# Patient Record
Sex: FEMALE | Race: OTHER | NOT HISPANIC OR LATINO | URBAN - METROPOLITAN AREA
[De-identification: names, ages, dates, MRNs, and addresses within clinical notes are randomized per-mention and may not be internally consistent; named-entity substitution may affect disease eponyms.]

---

## 2021-12-23 ENCOUNTER — EMERGENCY (EMERGENCY)
Facility: HOSPITAL | Age: 26
LOS: 1 days | Discharge: HOME | End: 2021-12-23
Attending: EMERGENCY MEDICINE | Admitting: EMERGENCY MEDICINE
Payer: MEDICAID

## 2021-12-23 VITALS
DIASTOLIC BLOOD PRESSURE: 99 MMHG | HEART RATE: 75 BPM | SYSTOLIC BLOOD PRESSURE: 131 MMHG | TEMPERATURE: 99 F | OXYGEN SATURATION: 100 % | RESPIRATION RATE: 16 BRPM

## 2021-12-23 LAB
ALBUMIN SERPL ELPH-MCNC: 4.6 G/DL — SIGNIFICANT CHANGE UP (ref 3.3–5)
ALP SERPL-CCNC: 58 U/L — SIGNIFICANT CHANGE UP (ref 40–120)
ALT FLD-CCNC: 22 U/L — SIGNIFICANT CHANGE UP (ref 4–33)
ANION GAP SERPL CALC-SCNC: 9 MMOL/L — SIGNIFICANT CHANGE UP (ref 7–14)
AST SERPL-CCNC: 18 U/L — SIGNIFICANT CHANGE UP (ref 4–32)
BASOPHILS # BLD AUTO: 0.04 K/UL — SIGNIFICANT CHANGE UP (ref 0–0.2)
BASOPHILS NFR BLD AUTO: 0.7 % — SIGNIFICANT CHANGE UP (ref 0–2)
BILIRUB SERPL-MCNC: 0.3 MG/DL — SIGNIFICANT CHANGE UP (ref 0.2–1.2)
BUN SERPL-MCNC: 12 MG/DL — SIGNIFICANT CHANGE UP (ref 7–23)
CALCIUM SERPL-MCNC: 8.9 MG/DL — SIGNIFICANT CHANGE UP (ref 8.4–10.5)
CHLORIDE SERPL-SCNC: 105 MMOL/L — SIGNIFICANT CHANGE UP (ref 98–107)
CO2 SERPL-SCNC: 24 MMOL/L — SIGNIFICANT CHANGE UP (ref 22–31)
CREAT SERPL-MCNC: 0.67 MG/DL — SIGNIFICANT CHANGE UP (ref 0.5–1.3)
EOSINOPHIL # BLD AUTO: 0.11 K/UL — SIGNIFICANT CHANGE UP (ref 0–0.5)
EOSINOPHIL NFR BLD AUTO: 1.8 % — SIGNIFICANT CHANGE UP (ref 0–6)
GLUCOSE SERPL-MCNC: 96 MG/DL — SIGNIFICANT CHANGE UP (ref 70–99)
HCT VFR BLD CALC: 39.6 % — SIGNIFICANT CHANGE UP (ref 34.5–45)
HGB BLD-MCNC: 14 G/DL — SIGNIFICANT CHANGE UP (ref 11.5–15.5)
IANC: 3.53 K/UL — SIGNIFICANT CHANGE UP (ref 1.5–8.5)
IMM GRANULOCYTES NFR BLD AUTO: 0.2 % — SIGNIFICANT CHANGE UP (ref 0–1.5)
LYMPHOCYTES # BLD AUTO: 1.76 K/UL — SIGNIFICANT CHANGE UP (ref 1–3.3)
LYMPHOCYTES # BLD AUTO: 29.3 % — SIGNIFICANT CHANGE UP (ref 13–44)
MCHC RBC-ENTMCNC: 31.1 PG — SIGNIFICANT CHANGE UP (ref 27–34)
MCHC RBC-ENTMCNC: 35.4 GM/DL — SIGNIFICANT CHANGE UP (ref 32–36)
MCV RBC AUTO: 88 FL — SIGNIFICANT CHANGE UP (ref 80–100)
MONOCYTES # BLD AUTO: 0.56 K/UL — SIGNIFICANT CHANGE UP (ref 0–0.9)
MONOCYTES NFR BLD AUTO: 9.3 % — SIGNIFICANT CHANGE UP (ref 2–14)
NEUTROPHILS # BLD AUTO: 3.53 K/UL — SIGNIFICANT CHANGE UP (ref 1.8–7.4)
NEUTROPHILS NFR BLD AUTO: 58.7 % — SIGNIFICANT CHANGE UP (ref 43–77)
NRBC # BLD: 0 /100 WBCS — SIGNIFICANT CHANGE UP
NRBC # FLD: 0 K/UL — SIGNIFICANT CHANGE UP
PLATELET # BLD AUTO: 299 K/UL — SIGNIFICANT CHANGE UP (ref 150–400)
POTASSIUM SERPL-MCNC: 4.2 MMOL/L — SIGNIFICANT CHANGE UP (ref 3.5–5.3)
POTASSIUM SERPL-SCNC: 4.2 MMOL/L — SIGNIFICANT CHANGE UP (ref 3.5–5.3)
PROT SERPL-MCNC: 7.6 G/DL — SIGNIFICANT CHANGE UP (ref 6–8.3)
RBC # BLD: 4.5 M/UL — SIGNIFICANT CHANGE UP (ref 3.8–5.2)
RBC # FLD: 12.5 % — SIGNIFICANT CHANGE UP (ref 10.3–14.5)
SARS-COV-2 RNA SPEC QL NAA+PROBE: SIGNIFICANT CHANGE UP
SODIUM SERPL-SCNC: 138 MMOL/L — SIGNIFICANT CHANGE UP (ref 135–145)
TSH SERPL-MCNC: 1.19 UIU/ML — SIGNIFICANT CHANGE UP (ref 0.27–4.2)
WBC # BLD: 6.01 K/UL — SIGNIFICANT CHANGE UP (ref 3.8–10.5)
WBC # FLD AUTO: 6.01 K/UL — SIGNIFICANT CHANGE UP (ref 3.8–10.5)

## 2021-12-23 PROCEDURE — 99284 EMERGENCY DEPT VISIT MOD MDM: CPT

## 2021-12-23 RX ORDER — SODIUM CHLORIDE 9 MG/ML
1000 INJECTION INTRAMUSCULAR; INTRAVENOUS; SUBCUTANEOUS ONCE
Refills: 0 | Status: COMPLETED | OUTPATIENT
Start: 2021-12-23 | End: 2021-12-23

## 2021-12-23 RX ADMIN — SODIUM CHLORIDE 1000 MILLILITER(S): 9 INJECTION INTRAMUSCULAR; INTRAVENOUS; SUBCUTANEOUS at 12:02

## 2021-12-23 NOTE — ED PROVIDER NOTE - ATTENDING CONTRIBUTION TO CARE
DR. ANGULO, ATTENDING MD-  I performed a face to face bedside interview with the patient regarding history of present illness, review of symptoms and past medical history. I completed an independent physical exam.  I have discussed the patient's plan of care with the resident.   Documentation as above in the note.    HPI / ROS / PE / MDM written by myself.

## 2021-12-23 NOTE — ED PROVIDER NOTE - CLINICAL SUMMARY MEDICAL DECISION MAKING FREE TEXT BOX
25 y/o female with lightheadedness, chills, diarrhea, nausea.  Likely viral syndrome, eval for anemia, lyte abn, thyroid dysfunction, pregnancy.  Obtain ekg cbc cmp tsh ucg covid swab give ivf bolus reassess, likely dc with pcp f/u as o/p.

## 2021-12-23 NOTE — ED ADULT TRIAGE NOTE - CHIEF COMPLAINT QUOTE
Pt c/o feeling like a cold rush and heat at the same time, nausea, and dizziness  x 2 days; diarrhea today.  Pt seen at [facility];   covid testing pending.

## 2021-12-23 NOTE — ED PROVIDER NOTE - NSFOLLOWUPINSTRUCTIONS_ED_ALL_ED_FT
1. TAKE ALL MEDICATIONS AS DIRECTED.  FOR PAIN YOU CAN TAKE ACETAMINOPHEN (TYLENOL) AS NEEDED, AS DIRECTED ON PACKAGING.  2. FOLLOW UP WITH primary care doctor AS DIRECTED.  YOU WERE GIVEN COPIES OF ALL LABS AND IMAGING RESULTS FROM YOUR ER VISIT--PLEASE TAKE THEM WITH YOU TO YOUR APPOINTMENT.  3. IF NEEDED, CALL [phone] TO FIND A PRIMARY CARE PHYSICIAN.  OR CALL [phone] TO MAKE AN APPOINTMENT WITH THE MEDICAL CLINIC.  4. You likely have a viral syndrome, continue to stay hydrated and return to ED if any new concerning or worsening symptoms.     Diarrhea    Diarrhea is frequent loose or watery bowel movements that has many causes. Diarrhea can make you feel weak and cause you to become dehydrated. Diarrhea typically lasts 2–3 days, but can last longer if it is a sign of something more serious. Drink clear fluids to prevent dehydration. Eat bland, easy-to-digest foods as tolerated.     SEEK IMMEDIATE MEDICAL CARE IF YOU HAVE ANY OF THE FOLLOWING SYMPTOMS: high fevers, lightheadedness/dizziness, chest pain, black or bloody stools, shortness of breath, severe abdominal or back pain, or any signs of dehydration.

## 2021-12-23 NOTE — ED PROVIDER NOTE - PROGRESS NOTE DETAILS
Danay Sierra DO (PGY1): Pt resting comfortably. Lab results non-actionable. Pt made aware of lab and imaging results. Questions regarding their symptoms were addressed. Advised to follow up with primary care doctor. Given strict return precautions. Pt verbalized understanding.

## 2021-12-23 NOTE — ED PROVIDER NOTE - PATIENT PORTAL LINK FT
You can access the FollowMyHealth Patient Portal offered by [facility] by registering at the following website: [email or web address]. By joining [employer]’s FollowMyHealth portal, you will also be able to view your health information using other applications (apps) compatible with our system.

## 2021-12-23 NOTE — ED PROVIDER NOTE - OBJECTIVE STATEMENT
27 y/o female with c/o chills lightheadedness diarrhea nausea x2 days.  Has covid vax.  Denies sick contacts or recent travel.  No prior h/o such sx.  Feels progressively worse.  [facility]

## 2021-12-23 NOTE — ED ADULT NURSE NOTE - OBJECTIVE STATEMENT
Pt c/o feeling like a cold rush and heat at the same time, nausea, and dizziness  x 2 days; diarrhea today. 20 g  line placed in RT AC, labs sent.

## 2022-01-09 ENCOUNTER — INPATIENT (INPATIENT)
Facility: HOSPITAL | Age: 27
LOS: 2 days | Discharge: HOME | End: 2022-01-12
Attending: STUDENT IN AN ORGANIZED HEALTH CARE EDUCATION/TRAINING PROGRAM | Admitting: STUDENT IN AN ORGANIZED HEALTH CARE EDUCATION/TRAINING PROGRAM
Payer: MEDICAID

## 2022-01-09 VITALS
TEMPERATURE: 98 F | SYSTOLIC BLOOD PRESSURE: 134 MMHG | DIASTOLIC BLOOD PRESSURE: 68 MMHG | OXYGEN SATURATION: 100 % | HEART RATE: 143 BPM | RESPIRATION RATE: 18 BRPM

## 2022-01-09 DIAGNOSIS — E87.1 HYPO-OSMOLALITY AND HYPONATREMIA: ICD-10-CM

## 2022-01-09 DIAGNOSIS — Z29.9 ENCOUNTER FOR PROPHYLACTIC MEASURES, UNSPECIFIED: ICD-10-CM

## 2022-01-09 DIAGNOSIS — N32.89 OTHER SPECIFIED DISORDERS OF BLADDER: ICD-10-CM

## 2022-01-09 DIAGNOSIS — A41.9 SEPSIS, UNSPECIFIED ORGANISM: ICD-10-CM

## 2022-01-09 DIAGNOSIS — N17.9 ACUTE KIDNEY FAILURE, UNSPECIFIED: ICD-10-CM

## 2022-01-09 DIAGNOSIS — R60.0 LOCALIZED EDEMA: ICD-10-CM

## 2022-01-09 DIAGNOSIS — L02.91 CUTANEOUS ABSCESS, UNSPECIFIED: ICD-10-CM

## 2022-01-09 DIAGNOSIS — L03.90 CELLULITIS, UNSPECIFIED: ICD-10-CM

## 2022-01-09 LAB
ALBUMIN SERPL ELPH-MCNC: 3.3 G/DL — SIGNIFICANT CHANGE UP (ref 3.3–5)
ALP SERPL-CCNC: 79 U/L — SIGNIFICANT CHANGE UP (ref 40–120)
ALT FLD-CCNC: 28 U/L — SIGNIFICANT CHANGE UP (ref 4–33)
ANION GAP SERPL CALC-SCNC: 16 MMOL/L — HIGH (ref 7–14)
ANION GAP SERPL CALC-SCNC: 17 MMOL/L — HIGH (ref 7–14)
APPEARANCE UR: CLEAR — SIGNIFICANT CHANGE UP
APTT BLD: 28.8 SEC — SIGNIFICANT CHANGE UP (ref 27–36.3)
AST SERPL-CCNC: 27 U/L — SIGNIFICANT CHANGE UP (ref 4–32)
BASOPHILS # BLD AUTO: 0 K/UL — SIGNIFICANT CHANGE UP (ref 0–0.2)
BASOPHILS # BLD AUTO: 0.04 K/UL — SIGNIFICANT CHANGE UP (ref 0–0.2)
BASOPHILS NFR BLD AUTO: 0 % — SIGNIFICANT CHANGE UP (ref 0–2)
BASOPHILS NFR BLD AUTO: 0.2 % — SIGNIFICANT CHANGE UP (ref 0–2)
BILIRUB SERPL-MCNC: 0.4 MG/DL — SIGNIFICANT CHANGE UP (ref 0.2–1.2)
BILIRUB UR-MCNC: NEGATIVE — SIGNIFICANT CHANGE UP
BLOOD GAS VENOUS COMPREHENSIVE RESULT: SIGNIFICANT CHANGE UP
BUN SERPL-MCNC: 30 MG/DL — HIGH (ref 7–23)
BUN SERPL-MCNC: 31 MG/DL — HIGH (ref 7–23)
CALCIUM SERPL-MCNC: 8.3 MG/DL — LOW (ref 8.4–10.5)
CALCIUM SERPL-MCNC: 9.1 MG/DL — SIGNIFICANT CHANGE UP (ref 8.4–10.5)
CHLORIDE SERPL-SCNC: 92 MMOL/L — LOW (ref 98–107)
CHLORIDE SERPL-SCNC: 99 MMOL/L — SIGNIFICANT CHANGE UP (ref 98–107)
CO2 SERPL-SCNC: 18 MMOL/L — LOW (ref 22–31)
CO2 SERPL-SCNC: 20 MMOL/L — LOW (ref 22–31)
COLOR SPEC: COLORLESS — SIGNIFICANT CHANGE UP
CREAT SERPL-MCNC: 2.26 MG/DL — HIGH (ref 0.5–1.3)
CREAT SERPL-MCNC: 2.64 MG/DL — HIGH (ref 0.5–1.3)
DIFF PNL FLD: NEGATIVE — SIGNIFICANT CHANGE UP
EOSINOPHIL # BLD AUTO: 0 K/UL — SIGNIFICANT CHANGE UP (ref 0–0.5)
EOSINOPHIL # BLD AUTO: 0.05 K/UL — SIGNIFICANT CHANGE UP (ref 0–0.5)
EOSINOPHIL NFR BLD AUTO: 0 % — SIGNIFICANT CHANGE UP (ref 0–6)
EOSINOPHIL NFR BLD AUTO: 0.2 % — SIGNIFICANT CHANGE UP (ref 0–6)
GLUCOSE SERPL-MCNC: 113 MG/DL — HIGH (ref 70–99)
GLUCOSE SERPL-MCNC: 88 MG/DL — SIGNIFICANT CHANGE UP (ref 70–99)
GLUCOSE UR QL: NEGATIVE — SIGNIFICANT CHANGE UP
HCG SERPL-ACNC: <5 MIU/ML — SIGNIFICANT CHANGE UP
HCT VFR BLD CALC: 29.6 % — LOW (ref 34.5–45)
HCT VFR BLD CALC: 33.5 % — LOW (ref 34.5–45)
HGB BLD-MCNC: 11.5 G/DL — SIGNIFICANT CHANGE UP (ref 11.5–15.5)
HGB BLD-MCNC: 9.8 G/DL — LOW (ref 11.5–15.5)
IANC: 15.06 K/UL — HIGH (ref 1.5–8.5)
IANC: 19.74 K/UL — HIGH (ref 1.5–8.5)
IMM GRANULOCYTES NFR BLD AUTO: 1.4 % — SIGNIFICANT CHANGE UP (ref 0–1.5)
INR BLD: 1.23 RATIO — HIGH (ref 0.88–1.16)
KETONES UR-MCNC: NEGATIVE — SIGNIFICANT CHANGE UP
LACTATE SERPL-SCNC: 2 MMOL/L — SIGNIFICANT CHANGE UP (ref 0.5–2)
LEUKOCYTE ESTERASE UR-ACNC: NEGATIVE — SIGNIFICANT CHANGE UP
LYMPHOCYTES # BLD AUTO: 0.88 K/UL — LOW (ref 1–3.3)
LYMPHOCYTES # BLD AUTO: 1.36 K/UL — SIGNIFICANT CHANGE UP (ref 1–3.3)
LYMPHOCYTES # BLD AUTO: 3.9 % — LOW (ref 13–44)
LYMPHOCYTES # BLD AUTO: 7.1 % — LOW (ref 13–44)
MAGNESIUM SERPL-MCNC: 2.4 MG/DL — SIGNIFICANT CHANGE UP (ref 1.6–2.6)
MCHC RBC-ENTMCNC: 30.3 PG — SIGNIFICANT CHANGE UP (ref 27–34)
MCHC RBC-ENTMCNC: 30.5 PG — SIGNIFICANT CHANGE UP (ref 27–34)
MCHC RBC-ENTMCNC: 33.1 GM/DL — SIGNIFICANT CHANGE UP (ref 32–36)
MCHC RBC-ENTMCNC: 34.3 GM/DL — SIGNIFICANT CHANGE UP (ref 32–36)
MCV RBC AUTO: 88.9 FL — SIGNIFICANT CHANGE UP (ref 80–100)
MCV RBC AUTO: 91.6 FL — SIGNIFICANT CHANGE UP (ref 80–100)
MONOCYTES # BLD AUTO: 1.72 K/UL — HIGH (ref 0–0.9)
MONOCYTES # BLD AUTO: 2.03 K/UL — HIGH (ref 0–0.9)
MONOCYTES NFR BLD AUTO: 10.6 % — SIGNIFICANT CHANGE UP (ref 2–14)
MONOCYTES NFR BLD AUTO: 7.6 % — SIGNIFICANT CHANGE UP (ref 2–14)
NEUTROPHILS # BLD AUTO: 14.75 K/UL — HIGH (ref 1.8–7.4)
NEUTROPHILS # BLD AUTO: 19.74 K/UL — HIGH (ref 1.8–7.4)
NEUTROPHILS NFR BLD AUTO: 72.6 % — SIGNIFICANT CHANGE UP (ref 43–77)
NEUTROPHILS NFR BLD AUTO: 86.7 % — HIGH (ref 43–77)
NITRITE UR-MCNC: NEGATIVE — SIGNIFICANT CHANGE UP
NRBC # BLD: 0 /100 WBCS — SIGNIFICANT CHANGE UP
NRBC # FLD: 0 K/UL — SIGNIFICANT CHANGE UP
PH UR: 6 — SIGNIFICANT CHANGE UP (ref 5–8)
PHOSPHATE SERPL-MCNC: 5.6 MG/DL — HIGH (ref 2.5–4.5)
PLATELET # BLD AUTO: 333 K/UL — SIGNIFICANT CHANGE UP (ref 150–400)
PLATELET # BLD AUTO: 373 K/UL — SIGNIFICANT CHANGE UP (ref 150–400)
POTASSIUM SERPL-MCNC: 4 MMOL/L — SIGNIFICANT CHANGE UP (ref 3.5–5.3)
POTASSIUM SERPL-MCNC: 4.3 MMOL/L — SIGNIFICANT CHANGE UP (ref 3.5–5.3)
POTASSIUM SERPL-SCNC: 4 MMOL/L — SIGNIFICANT CHANGE UP (ref 3.5–5.3)
POTASSIUM SERPL-SCNC: 4.3 MMOL/L — SIGNIFICANT CHANGE UP (ref 3.5–5.3)
PROT SERPL-MCNC: 7.6 G/DL — SIGNIFICANT CHANGE UP (ref 6–8.3)
PROT UR-MCNC: NEGATIVE — SIGNIFICANT CHANGE UP
PROTHROM AB SERPL-ACNC: 13.9 SEC — HIGH (ref 10.6–13.6)
RBC # BLD: 3.23 M/UL — LOW (ref 3.8–5.2)
RBC # BLD: 3.77 M/UL — LOW (ref 3.8–5.2)
RBC # FLD: 12.8 % — SIGNIFICANT CHANGE UP (ref 10.3–14.5)
RBC # FLD: 13.1 % — SIGNIFICANT CHANGE UP (ref 10.3–14.5)
SARS-COV-2 RNA SPEC QL NAA+PROBE: SIGNIFICANT CHANGE UP
SODIUM SERPL-SCNC: 129 MMOL/L — LOW (ref 135–145)
SODIUM SERPL-SCNC: 133 MMOL/L — LOW (ref 135–145)
SP GR SPEC: 1.01 — SIGNIFICANT CHANGE UP (ref 1–1.05)
UROBILINOGEN FLD QL: SIGNIFICANT CHANGE UP
WBC # BLD: 19.16 K/UL — HIGH (ref 3.8–10.5)
WBC # BLD: 22.76 K/UL — HIGH (ref 3.8–10.5)
WBC # FLD AUTO: 19.16 K/UL — HIGH (ref 3.8–10.5)
WBC # FLD AUTO: 22.76 K/UL — HIGH (ref 3.8–10.5)

## 2022-01-09 PROCEDURE — 93010 ELECTROCARDIOGRAM REPORT: CPT

## 2022-01-09 PROCEDURE — 71045 X-RAY EXAM CHEST 1 VIEW: CPT | Mod: 26

## 2022-01-09 PROCEDURE — 71260 CT THORAX DX C+: CPT | Mod: 26,MA

## 2022-01-09 PROCEDURE — 99223 1ST HOSP IP/OBS HIGH 75: CPT | Mod: GC

## 2022-01-09 PROCEDURE — 74177 CT ABD & PELVIS W/CONTRAST: CPT | Mod: 26,MA

## 2022-01-09 PROCEDURE — 99285 EMERGENCY DEPT VISIT HI MDM: CPT | Mod: 25

## 2022-01-09 RX ORDER — MUPIROCIN 20 MG/G
0 OINTMENT TOPICAL
Qty: 0 | Refills: 0 | DISCHARGE

## 2022-01-09 RX ORDER — SODIUM CHLORIDE 9 MG/ML
1000 INJECTION, SOLUTION INTRAVENOUS ONCE
Refills: 0 | Status: COMPLETED | OUTPATIENT
Start: 2022-01-09 | End: 2022-01-09

## 2022-01-09 RX ORDER — SODIUM CHLORIDE 9 MG/ML
1000 INJECTION, SOLUTION INTRAVENOUS
Refills: 0 | Status: DISCONTINUED | OUTPATIENT
Start: 2022-01-09 | End: 2022-01-12

## 2022-01-09 RX ORDER — ONDANSETRON 8 MG/1
4 TABLET, FILM COATED ORAL ONCE
Refills: 0 | Status: COMPLETED | OUTPATIENT
Start: 2022-01-09 | End: 2022-01-09

## 2022-01-09 RX ORDER — ONDANSETRON 8 MG/1
0 TABLET, FILM COATED ORAL
Qty: 0 | Refills: 0 | DISCHARGE

## 2022-01-09 RX ORDER — IBUPROFEN 200 MG
0 TABLET ORAL
Qty: 0 | Refills: 0 | DISCHARGE

## 2022-01-09 RX ORDER — MORPHINE SULFATE 50 MG/1
4 CAPSULE, EXTENDED RELEASE ORAL ONCE
Refills: 0 | Status: DISCONTINUED | OUTPATIENT
Start: 2022-01-09 | End: 2022-01-09

## 2022-01-09 RX ORDER — ACETAMINOPHEN 500 MG
1000 TABLET ORAL ONCE
Refills: 0 | Status: COMPLETED | OUTPATIENT
Start: 2022-01-09 | End: 2022-01-09

## 2022-01-09 RX ORDER — SODIUM CHLORIDE 9 MG/ML
1000 INJECTION INTRAMUSCULAR; INTRAVENOUS; SUBCUTANEOUS ONCE
Refills: 0 | Status: COMPLETED | OUTPATIENT
Start: 2022-01-09 | End: 2022-01-09

## 2022-01-09 RX ORDER — VANCOMYCIN HCL 1 G
1000 VIAL (EA) INTRAVENOUS ONCE
Refills: 0 | Status: COMPLETED | OUTPATIENT
Start: 2022-01-09 | End: 2022-01-09

## 2022-01-09 RX ORDER — CEFAZOLIN SODIUM 1 G
2000 VIAL (EA) INJECTION ONCE
Refills: 0 | Status: COMPLETED | OUTPATIENT
Start: 2022-01-09 | End: 2022-01-09

## 2022-01-09 RX ORDER — HEPARIN SODIUM 5000 [USP'U]/ML
5000 INJECTION INTRAVENOUS; SUBCUTANEOUS EVERY 8 HOURS
Refills: 0 | Status: DISCONTINUED | OUTPATIENT
Start: 2022-01-09 | End: 2022-01-12

## 2022-01-09 RX ORDER — ENOXAPARIN SODIUM 100 MG/ML
40 INJECTION SUBCUTANEOUS DAILY
Refills: 0 | Status: DISCONTINUED | OUTPATIENT
Start: 2022-01-09 | End: 2022-01-09

## 2022-01-09 RX ORDER — ONDANSETRON 8 MG/1
8 TABLET, FILM COATED ORAL
Qty: 0 | Refills: 0 | DISCHARGE

## 2022-01-09 RX ADMIN — SODIUM CHLORIDE 1000 MILLILITER(S): 9 INJECTION, SOLUTION INTRAVENOUS at 06:30

## 2022-01-09 RX ADMIN — ONDANSETRON 4 MILLIGRAM(S): 8 TABLET, FILM COATED ORAL at 05:29

## 2022-01-09 RX ADMIN — Medication 1000 MILLIGRAM(S): at 07:00

## 2022-01-09 RX ADMIN — MORPHINE SULFATE 4 MILLIGRAM(S): 50 CAPSULE, EXTENDED RELEASE ORAL at 05:29

## 2022-01-09 RX ADMIN — SODIUM CHLORIDE 1000 MILLILITER(S): 9 INJECTION, SOLUTION INTRAVENOUS at 08:29

## 2022-01-09 RX ADMIN — Medication 1000 MILLIGRAM(S): at 06:00

## 2022-01-09 RX ADMIN — Medication 100 MILLIGRAM(S): at 05:30

## 2022-01-09 RX ADMIN — SODIUM CHLORIDE 75 MILLILITER(S): 9 INJECTION, SOLUTION INTRAVENOUS at 15:01

## 2022-01-09 RX ADMIN — Medication 400 MILLIGRAM(S): at 05:30

## 2022-01-09 RX ADMIN — Medication 250 MILLIGRAM(S): at 09:40

## 2022-01-09 RX ADMIN — SODIUM CHLORIDE 1000 MILLILITER(S): 9 INJECTION INTRAMUSCULAR; INTRAVENOUS; SUBCUTANEOUS at 08:51

## 2022-01-09 RX ADMIN — SODIUM CHLORIDE 1000 MILLILITER(S): 9 INJECTION, SOLUTION INTRAVENOUS at 07:05

## 2022-01-09 RX ADMIN — Medication 2000 MILLIGRAM(S): at 06:00

## 2022-01-09 RX ADMIN — SODIUM CHLORIDE 1000 MILLILITER(S): 9 INJECTION, SOLUTION INTRAVENOUS at 05:41

## 2022-01-09 RX ADMIN — MORPHINE SULFATE 4 MILLIGRAM(S): 50 CAPSULE, EXTENDED RELEASE ORAL at 06:00

## 2022-01-09 NOTE — H&P ADULT - NSICDXFAMILYHX_GEN_ALL_CORE_FT
FAMILY HISTORY:  Family history of early CAD    Father  Still living? Unknown  FH: CAD (coronary artery disease), Age at diagnosis: Age Unknown  FH: HTN (hypertension), Age at diagnosis: Age Unknown  FHx: diabetes mellitus, Age at diagnosis: Age Unknown    Mother  Still living? Unknown  FHx: diabetes mellitus, Age at diagnosis: Age Unknown

## 2022-01-09 NOTE — ED PROVIDER NOTE - PHYSICAL EXAMINATION
GENERAL: Patient awake alert NAD.  HEENT: NC/AT.  LUNGS: CTAB, no wheezes or crackles.   CARDIAC: RRR, no m/r/g.    ABDOMEN: Soft, diffuse abd tenderness, ND, No rebound, guarding.  EXT: 2+ pitting edema of b/l LEs. No calf tenderness. CV 2+DP/PT bilaterally.  MSK: No deformities.  NEURO: A&Ox3. Moving all extremities.  SKIN: Erythema and warmth over b/l buttocks at the site of procedure, TTP, lower abdomen erythematous, warm and TTP. Blister noted on left flank. Ecchymosis over lower chest.  PSYCH: Normal affect.

## 2022-01-09 NOTE — H&P ADULT - NSHPREVIEWOFSYSTEMS_GEN_ALL_CORE
REVIEW OF SYSTEMS:  CONSTITUTIONAL: + fever, +chills, + fatigue  NECK: No LAD  RESPIRATORY: No cough, or hemoptysis; No shortness of breath  CARDIOVASCULAR: No chest pain, palpitations, +dizziness, +leg swelling  GASTROINTESTINAL: +Abdominal pain, +nausea, no vomiting or hematemesis; +constipation.  GENITOURINARY: No dysuria, frequency, hematuria, or incontinence  SKIN: +Erythema and tenderness along b/l buttocks  LYMPH NODES: No enlarged glands  MUSCULOSKELETAL: +Buttock pain. No muscle, back, or extremity pain.   HEME/LYMPH: No easy bruising, or bleeding gums

## 2022-01-09 NOTE — H&P ADULT - NSHPPHYSICALEXAM_GEN_ALL_CORE
VITALS:   T(C): 37.3 (01-09-22 @ 10:28), Max: 37.3 (01-09-22 @ 10:28)  HR: 124 (01-09-22 @ 10:28) (113 - 143)  BP: 115/73 (01-09-22 @ 10:28) (115/73 - 134/68)  RR: 20 (01-09-22 @ 10:28) (16 - 20)  SpO2: 100% (01-09-22 @ 10:28) (99% - 100%)    PHYSICAL EXAM:     GENERAL: NAD, prone on bed.  HEAD:  Atraumatic, normocephalic.  EYES: PERRLA, conjunctiva and sclera clear.  ENT: Moist mucous membranes.  NECK: Supple, no LAD.  CHEST/LUNG: CTAB. No rales, rhonchi, wheezing, or rubs. Unlabored respirations.  HEART: Tachycardic, no M/R/G, S1/S2  ABDOMEN: Tense, diffusely tender to light touch. Normoactive bowel sounds.  EXTREMITIES:  2+ peripheral pulses b/l, brisk capillary refill. 2+ pitting edema.  Neurological:  AAOx3.   MSK: Tense, tender b/l buttocks.  SKIN: Erythema on b/l hips near surgical incision sites, L>>R, measuring ~6 x 6 in on R and 4 x 4 in on L.  PSYCH: Normal affect and mood. VITALS:   T(C): 37.3 (01-09-22 @ 10:28), Max: 37.3 (01-09-22 @ 10:28)  HR: 124 (01-09-22 @ 10:28) (113 - 143)  BP: 115/73 (01-09-22 @ 10:28) (115/73 - 134/68)  RR: 20 (01-09-22 @ 10:28) (16 - 20)  SpO2: 100% (01-09-22 @ 10:28) (99% - 100%)    PHYSICAL EXAM:     GENERAL: NAD, prone on bed.  HEAD:  Atraumatic, normocephalic.  EYES: PERRLA, conjunctiva and sclera clear.  ENT: Moist mucous membranes.  NECK: Supple, no LAD.  CHEST/LUNG: CTAB. No rales, rhonchi, wheezing, or rubs. Unlabored respirations.  HEART: Tachycardic, no M/R/G, S1/S2  ABDOMEN: Tense, diffusely tender to light touch. Normoactive bowel sounds.  EXTREMITIES:  2+ peripheral pulses b/l, brisk capillary refill. 1-2+ pitting edema.  Neurological:  AAOx3.   MSK: Tense, tender b/l buttocks.  SKIN: Erythema and induration on b/l hips near surgical incision sites, L>>R, measuring ~6 x 6 in on R and 4 x 4 in on L.  PSYCH: Normal affect and mood.

## 2022-01-09 NOTE — ED ADULT NURSE NOTE - NSIMPLEMENTINTERV_GEN_ALL_ED
Implemented All Fall Risk Interventions:  [place] to call system. Call bell, personal items and telephone within reach. Instruct patient to call for assistance. Room bathroom lighting operational. Non-slip footwear when patient is off stretcher. Physically safe environment: no spills, clutter or unnecessary equipment. Stretcher in lowest position, wheels locked, appropriate side rails in place. Provide visual cue, wrist band, yellow gown, etc. Monitor gait and stability. Monitor for mental status changes and reorient to person, place, and time. Review medications for side effects contributing to fall risk. Reinforce activity limits and safety measures with patient and family.

## 2022-01-09 NOTE — ED ADULT NURSE REASSESSMENT NOTE - NS ED NURSE REASSESS COMMENT FT1
Pt awake, alert and oriented x 4 reports improvement in pain from earlier.   Currently denies pain while lying prone.   denies n/v/d.    denies chest pain and SOB.   denies headache or dizziness.    IV site x 2 unremarkable.    Tolerating PO.    Ambulatory independently to bathroom to void.    awaiting inpatient bed.

## 2022-01-09 NOTE — ED ADULT TRIAGE NOTE - BRAND OF COVID-19 VACCINATION
Digital Medicine: Health  Current Pfizer dose 1 and 2

## 2022-01-09 NOTE — H&P ADULT - ATTENDING COMMENTS
26F no PMH, s/p BBL on 1/2/22 now presented with worsening pain and swelling of her surgical sites. Patient reports red and purulent drainage at home ~3 days ago. pain and swelling worsen overnight. was treated with Bactrim by her surgeon w/o improvement. Vitals, afebrile, tachycardic, HD stable. Labs, leukocytosis, REGINALD, no lactate or significant acidemia. CT done with b/l mild hydroureteronephrosis iso distended bladder. On exam, slightly lethargic, tachycardic, significant edema in b/l LE, and patient has subjective swelling of her hands.   Impression is 26F with sepsis secondary to cellulitis/inflammation of her surgical sites, resulted in her REGINALD. no notable collection on exam or CT. no significant lactate to suggest Nec [place]. no drainage now for culture. Bcx pending. s/p Vanc and cefazolin, likely organism include Staph, strep and enterococcus. Vanco alone should suffice for coverage at this time. f/u cultures and monitor response. vanco dosing by trough. need weight for CrCl calculation.  REGINALD iso infection, pre-renal vs ATN. potential post-obstructive given CT finding, but patient is urinating per nursing. check PVR to ensure adequate emptying of bladder. s/p 3L by ED, will cont IVF given persistent tachycardia to 120s. monitor UOP. Hyponatremia should resolve with hydration, monitor labs. b/l LE edema, checking dopplers to rule-out DVT given recent surgery and decreased mobility due to infection and pain. DVT ppx, diet as tolerated.   plan discussed with House staff, RN and patient at bedside.

## 2022-01-09 NOTE — H&P ADULT - PROBLEM SELECTOR PLAN 3
- Patient found to be hyponatremic with Na 129 on admission  - Nephro c/s as above - Patient found to be hyponatremic with Na 129 on admission  - Likely in acute setting - Patient found to be hyponatremic with Na 129 on admission  - Likely in acute setting  - s/p 3L fluids  - repeat labs  - maintenance fluids

## 2022-01-09 NOTE — H&P ADULT - PROBLEM SELECTOR PLAN 1
- 2/2 infection (cellulitis vs. abscess) 2/2 BBL on 1/2/2022 at [facility] (Replaced by [facility]  - L-side ~6x6in, erythematous  - R-side ~4x4in  - No abscess seen on CT, however cannot r/o cellulitis  - Pt given prophylactic bactrim by plastic surgeon  - Afebrile on admission, but reported F/C prior to admission  - Tachy to 140s on admission  - WBC 19.16 on admission  - UA negative  - BCx, UCx pending  - Empiric coverage with vanc/jarad iso REGINALD  - Tylenol PRN for fever - 2/2 infection (cellulitis vs. abscess) 2/2 BBL on 1/2/2022 at [facility] ([facility]  - L-side ~6x6in, erythematous  - R-side ~4x4in  - No abscess seen on CT, however cannot r/o cellulitis  - Pt given prophylactic bactrim by plastic surgeon  - Afebrile on admission, but reported F/C prior to admission  - Tachy to 140s on admission  - WBC 19.16 on admission  - UA negative  - BCx, UCx pending  - Empiric coverage with vanc/jarad iso REGINALD  - Tylenol PRN for fever  - Plastics c/s  - Consider ID c/s pending cxs - 2/2 infection (cellulitis vs. abscess) [facility]  - L-side enlarged, erythematous and indurated to greater extent than R-side  - No abscess seen on CT, however cannot r/o cellulitis  - Pt given prophylactic bactrim by plastic surgeon  - Afebrile on admission, but reported F/C prior to admission  - Tachy to 140s on admission  - WBC 19.16 on admission  - Lactate 1.6  - UA negative  - BCx, UCx pending  - s/f cellulitis  - Vanc by trough     - get weight  - Tylenol PRN for fever  - Can c/s Plastics and ID if complications arise  - Repeat CBC, lactate 5pm

## 2022-01-09 NOTE — H&P ADULT - PROBLEM SELECTOR PLAN 4
DVT PPx: Lovenox   Diet: Renal  Dispo: - 2+ PE today on exam  - Likely 2/2 3rd spacing from vasodilation 2/2 sepsis  - r/o DVT - Dopplers

## 2022-01-09 NOTE — ED PROVIDER NOTE - OBJECTIVE STATEMENT
26F w/ no significant PMHx p/w fevers, chills, palpitations, pain at site of surgery, b/l LE edema after having a Brazilian Butt Lift on 1/2/22 at [facility].  Pt. had a f/u appt w/ plastic surgeon a few days ago, who states the edema, ecchymosis, and erythema is to be expected.  Pt. was started on bactrim prophylactically.  States she's vomiting and unable to tolerate PO and cannot sit down.  Denies any CP, SOB.

## 2022-01-09 NOTE — H&P ADULT - HISTORY OF PRESENT ILLNESS
Patient is a 25 yo F with PMH herniated disc, renal artery enlargement (unclear laterality) presenting with pain and erythema in b/l buttocks and swelling in buttocks, genitals and LE following Brazilian butt lift on 1/2/2022. Patient reports associated F/C, N without vomiting, HA, dizziness (predominantly orthostatic), fatigue, and numbness in the affected areas starting 1/6/2022. Patient was seen by [facility] in [place] for the procedure. She had followed up with her plastic surgeon a few days ago, who reported that her edema, ecchymosis and erythema were to be expected. Patient was started on bactrim DS prophylactically. Patient has not been able to lie down comfortably on her back and is having pain sitting down. Denies CP, SOB, cough, LAD, abnormal bleeding.    Patient is COVID vaccinated x2 (last dose 8/2022)    In ED, patient was given vanco x1, cefazolin x1, 2L LR, 1L NS, morphine 4mg x1. WBC 19, Cr 2.26, UA neg. BCx, UCx pending. CXR clear, CT C/A/P +diffuse subq edema + fatty stranding, w/o fluid to suggestion abscess, and mild hydroureteronephrosis likely 2/2 distended bladder, no stone. Patient is a 25 yo F with PMH herniated disc, renal artery enlargement (unclear laterality) presenting with pain and erythema in b/l buttocks and swelling in buttocks, genitals and LE following Brazilian butt lift on 1/2/2022. Patient reports associated F/C, N without vomiting, HA, dizziness (predominantly orthostatic), fatigue, and numbness in the affected areas starting 1/6/2022. Patient was seen by [facility] in [place] for the procedure. She had followed up with her plastic surgeon a few days ago, who reported that her edema, ecchymosis and erythema were to be expected. Patient was started on bactrim DS prophylactically. Patient has not been able to lie down comfortably on her back and is having pain sitting down. Denies CP, SOB, cough, LAD, abnormal bleeding.    Patient is COVID vaccinated x2 (last dose 8/2022)    ED course: afebrile, tachy to 140s, lactate 1.6. WBC 19, Cr 2.26, Na 129, UA neg. BCx, UCx pending. CXR clear, CT C/A/P +diffuse subq edema + fatty stranding, w/o fluid to suggestion abscess, and mild hydroureteronephrosis likely 2/2 distended bladder, no stone. s/p vanco x1, cefazolin x1, 2L LR, 1L NS, morphine 4mg x1.

## 2022-01-09 NOTE — ED PROVIDER NOTE - CLINICAL SUMMARY MEDICAL DECISION MAKING FREE TEXT BOX
26F p/w palpitations, f/c, n/v, erythematous, warm tender areas at surgical incision sites after having a cosmetic plastic surgery on 1/2.  Exam as above, VS w/ tachycardia.  Pt. unable to sit 2/2 pain.  Concern that pt. is septic from a cellulitis.  Given extensive area of ecchymosis and erythema, will obtain CT of chest/abd/pelvis w/ extension to mid thighs.  Will obtain labs, administer fluids, abx, analgesics, anti-emetics, reassess, and dispo pending results.

## 2022-01-09 NOTE — ED PROVIDER NOTE - PROGRESS NOTE DETAILS
Aly, PGY3: pt tachy to 110, hospitalist recs for another liter fluids and to expand to give mrsa coverage with vanco as its from surg site infx

## 2022-01-09 NOTE — H&P ADULT - NSICDXPASTMEDICALHX_GEN_ALL_CORE_FT
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

## 2022-01-09 NOTE — ED PROVIDER NOTE - WET READ LAUNCH FT
ASSESSMENT/PLAN:  1.  Peripheral edema: Check echo.  Advise to get velcro stockings. Check labs. Add spironolactone per orders. Recheck 1 week.    SUBJECTIVE:  Chief Complaint   Patient presents with   • Leg     swelling and red marks      1.  Peripheral Edema; Having problems with swelling in legs; seems to be getting worse; tries to elevate. Can't get stockings that don't compress too much or cause problems.  Taking meds daily.    I have reviewed the patient's medications and allergies, past medical, surgical, social and family history, updating these as appropriate.  See Histories section of the Electronic Medical Record for a display of this information.    Review of Systems:   As above per the History of Present Illness, otherwise essentially negative.  Constitutional: Negative for fever and chills.   Respiratory: Negative for cough and shortness of breath.    Cardiovascular: Negative for chest pain or chest pressure.   Gastrointestinal: Negative for nausea, vomiting, abdominal pain and diarrhea.   Genitourinary: Negative for dysuria, urgency, frequency, flank pain or hematuria.   Skin: Negative for rash.   Neurological: Negative for headaches, vertigo, change in sensory or motor function.   All other systems reviewed and are negative.    OBJECTIVE:  Blood pressure 120/66, pulse 86, temperature 98.4 °F (36.9 °C), temperature source Tympanic, resp. rate 18, height 6' (1.829 m), weight (!) 136.5 kg. Body mass index is 40.82 kg/m².  Heart:  No click, no gallop, no heaves, no murmur, no rub, no thrills, regular rate and rhythm, S1 normal and S2 normal.  Lungs:  Clear to auscultation anteriorly and posteriorly bilaterally and normal percussion posteriorly bilaterally.  Abdomen:  Bowel sounds normal, no hernias, no masses, no hepatomegaly or splenomegaly, not tender and soft.  Extremities:  Normal range of motion of all joints, no clubbing, no cyanosis, no deformity noted and no edema.         There are no Wet Read(s) to document.

## 2022-01-09 NOTE — ED ADULT NURSE NOTE - OBJECTIVE STATEMENT
A&oX4 ambulatory self care 26 yr old female presenting to the ed today from home for fever, chills, n/v. pt recently received a BBL in [facility] on 1/2/22. pt has brusing present on hips, vaginal area, and buttock. blistering noted on butt, buttocks appear red and swollen. pt unable to lay on back due to pain. bilateral 20g ivs placed in acs. labs drawn and sent. medicated as ordered

## 2022-01-09 NOTE — ED ADULT TRIAGE NOTE - CHIEF COMPLAINT QUOTE
Pt c/o chest pain with palpitations, SOB, fever, chills, nausea, lightheadedness, b/l lower extremity pain and vaginal pain for 3x days. S/p BBL January 2.

## 2022-01-09 NOTE — H&P ADULT - PROBLEM SELECTOR PLAN 2
- Cr 2.26 on admission (BL ~0.7)  - likely 2/2 sepsis  - s/p 2L LR, 1L NS in ED  - Nephro c/s  - Avoid nephrotoxic agents - Cr 2.26 on admission (BL ~0.7)  - likely 2/2 sepsis  - s/p 2L LR, 1L NS in ED  - Avoid nephrotoxic agents  - Maintenance LR  - Repeat BMP 5pm  - ctm

## 2022-01-09 NOTE — H&P ADULT - ASSESSMENT
27 yo F with PMH herniated discs x3 and aneurysmal renal artery (unspecified laterality) presenting with sepsis 2/2 cellulitis vs. abscesses 2/2 BBL and REGINALD, also found to be hyponatremic. 25 yo F with PMH herniated discs x3 and aneurysmal renal artery (unspecified laterality) presenting with sepsis 2/2 cellulitis vs. abscesses 2/2 BBL and REGINALD, also found to be hyponatremic.

## 2022-01-09 NOTE — H&P ADULT - PROBLEM SELECTOR PLAN 5
- Bladder distension w/o obstruction stone seen on CT   - Pt urinating frequently  - Bladder scan post-void

## 2022-01-09 NOTE — H&P ADULT - NSHPSOCIALHISTORY_GEN_ALL_CORE
Patient lives with her mother and father, but is currently staying with her cousin Elgin ([phone]).    1/2 Doxy 100 bid   Mupirocin ointment tid  Ibu 800 q6  Zofran OTD 8mg Patient lives with her mother and father, but is currently staying with her cousin Elgin ([phone]). Patient denies cigarette smoking (sometimes smokes hookah) and recreational drug use. Drinks "not a lot" every other weekend. Pharmacy is [facility] in [place] ([address]

## 2022-01-09 NOTE — H&P ADULT - NSHPLABSRESULTS_GEN_ALL_CORE
LABS:                           11.5   19.16 )-----------( 373      ( 2022 05:55 )             33.5         129<L>  |  92<L>  |  31<H>  ----------------------------<  88  4.3   |  20<L>  |  2.26<H>    Ca    9.1      2022 05:55    TPro  7.6  /  Alb  3.3  /  TBili  0.4  /  DBili  x   /  AST  27  /  ALT  28  /  AlkPhos  79          PT/INR - ( 2022 05:55 )   PT: 13.9 sec;   INR: 1.23 ratio         PTT - ( 2022 05:55 )  PTT:28.8 sec      Urinalysis Basic - ( 2022 07:17 )    Color: Colorless / Appearance: Clear / S.009 / pH: x  Gluc: x / Ketone: Negative  / Bili: Negative / Urobili: <2 mg/dL   Blood: x / Protein: Negative / Nitrite: Negative   Leuk Esterase: Negative / RBC: x / WBC x   Sq Epi: x / Non Sq Epi: x / Bacteria: x        LIVER FUNCTIONS - ( 2022 05:55 )  Alb: 3.3 g/dL / Pro: 7.6 g/dL / ALK PHOS: 79 U/L / ALT: 28 U/L / AST: 27 U/L / GGT: x             < from: Xray Chest 1 View- PORTABLE-Urgent (Xray Chest 1 View- PORTABLE-Urgent .) (22 @ 05:58) >      IMPRESSION:  Clear lungs.    < end of copied text >        < from: CT Chest w/ IV Cont (22 @ 06:50) >    IMPRESSION:    Diffuse areas of subcutaneous edema and fatty stranding, likely   postprocedural in etiology. No drainable fluid collection is seen to   suggest abscess formation. However, underlying cellulitis cannot be   excluded.    Mild bilateral hydroureteronephrosis likely secondary to distended   bladder, no evidence of obstructing stone.      < end of copied text >

## 2022-01-10 LAB
ALBUMIN SERPL ELPH-MCNC: 2.9 G/DL — LOW (ref 3.3–5)
ALP SERPL-CCNC: 76 U/L — SIGNIFICANT CHANGE UP (ref 40–120)
ALT FLD-CCNC: 13 U/L — SIGNIFICANT CHANGE UP (ref 4–33)
ANION GAP SERPL CALC-SCNC: 14 MMOL/L — SIGNIFICANT CHANGE UP (ref 7–14)
ANISOCYTOSIS BLD QL: SLIGHT — SIGNIFICANT CHANGE UP
AST SERPL-CCNC: 19 U/L — SIGNIFICANT CHANGE UP (ref 4–32)
BASOPHILS # BLD AUTO: 0 K/UL — SIGNIFICANT CHANGE UP (ref 0–0.2)
BASOPHILS # BLD AUTO: 0.06 K/UL — SIGNIFICANT CHANGE UP (ref 0–0.2)
BASOPHILS NFR BLD AUTO: 0 % — SIGNIFICANT CHANGE UP (ref 0–2)
BASOPHILS NFR BLD AUTO: 0.3 % — SIGNIFICANT CHANGE UP (ref 0–2)
BILIRUB SERPL-MCNC: 0.4 MG/DL — SIGNIFICANT CHANGE UP (ref 0.2–1.2)
BUN SERPL-MCNC: 31 MG/DL — HIGH (ref 7–23)
CALCIUM SERPL-MCNC: 8.8 MG/DL — SIGNIFICANT CHANGE UP (ref 8.4–10.5)
CHLORIDE SERPL-SCNC: 103 MMOL/L — SIGNIFICANT CHANGE UP (ref 98–107)
CO2 SERPL-SCNC: 20 MMOL/L — LOW (ref 22–31)
CREAT SERPL-MCNC: 2.38 MG/DL — HIGH (ref 0.5–1.3)
CULTURE RESULTS: SIGNIFICANT CHANGE UP
EOSINOPHIL # BLD AUTO: 0 K/UL — SIGNIFICANT CHANGE UP (ref 0–0.5)
EOSINOPHIL # BLD AUTO: 0.08 K/UL — SIGNIFICANT CHANGE UP (ref 0–0.5)
EOSINOPHIL NFR BLD AUTO: 0 % — SIGNIFICANT CHANGE UP (ref 0–6)
EOSINOPHIL NFR BLD AUTO: 0.3 % — SIGNIFICANT CHANGE UP (ref 0–6)
GLUCOSE SERPL-MCNC: 96 MG/DL — SIGNIFICANT CHANGE UP (ref 70–99)
HCT VFR BLD CALC: 28.4 % — LOW (ref 34.5–45)
HCT VFR BLD CALC: 29.8 % — LOW (ref 34.5–45)
HGB BLD-MCNC: 9.3 G/DL — LOW (ref 11.5–15.5)
HGB BLD-MCNC: 9.7 G/DL — LOW (ref 11.5–15.5)
IANC: 19.7 K/UL — HIGH (ref 1.5–8.5)
IANC: 24.77 K/UL — HIGH (ref 1.5–8.5)
IMM GRANULOCYTES NFR BLD AUTO: 1.6 % — HIGH (ref 0–1.5)
LYMPHOCYTES # BLD AUTO: 1.21 K/UL — SIGNIFICANT CHANGE UP (ref 1–3.3)
LYMPHOCYTES # BLD AUTO: 1.97 K/UL — SIGNIFICANT CHANGE UP (ref 1–3.3)
LYMPHOCYTES # BLD AUTO: 5.2 % — LOW (ref 13–44)
LYMPHOCYTES # BLD AUTO: 7 % — LOW (ref 13–44)
MACROCYTES BLD QL: SLIGHT — SIGNIFICANT CHANGE UP
MAGNESIUM SERPL-MCNC: 2.5 MG/DL — SIGNIFICANT CHANGE UP (ref 1.6–2.6)
MCHC RBC-ENTMCNC: 30.2 PG — SIGNIFICANT CHANGE UP (ref 27–34)
MCHC RBC-ENTMCNC: 30.2 PG — SIGNIFICANT CHANGE UP (ref 27–34)
MCHC RBC-ENTMCNC: 32.6 GM/DL — SIGNIFICANT CHANGE UP (ref 32–36)
MCHC RBC-ENTMCNC: 32.7 GM/DL — SIGNIFICANT CHANGE UP (ref 32–36)
MCV RBC AUTO: 92.2 FL — SIGNIFICANT CHANGE UP (ref 80–100)
MCV RBC AUTO: 92.8 FL — SIGNIFICANT CHANGE UP (ref 80–100)
MONOCYTES # BLD AUTO: 0.73 K/UL — SIGNIFICANT CHANGE UP (ref 0–0.9)
MONOCYTES # BLD AUTO: 1.98 K/UL — HIGH (ref 0–0.9)
MONOCYTES NFR BLD AUTO: 2.6 % — SIGNIFICANT CHANGE UP (ref 2–14)
MONOCYTES NFR BLD AUTO: 8.5 % — SIGNIFICANT CHANGE UP (ref 2–14)
NEUTROPHILS # BLD AUTO: 19.7 K/UL — HIGH (ref 1.8–7.4)
NEUTROPHILS # BLD AUTO: 25.47 K/UL — HIGH (ref 1.8–7.4)
NEUTROPHILS NFR BLD AUTO: 84.1 % — HIGH (ref 43–77)
NEUTROPHILS NFR BLD AUTO: 90.4 % — HIGH (ref 43–77)
NRBC # BLD: 0 /100 WBCS — SIGNIFICANT CHANGE UP
NRBC # FLD: 0 K/UL — SIGNIFICANT CHANGE UP
PHOSPHATE SERPL-MCNC: 5.1 MG/DL — HIGH (ref 2.5–4.5)
PLAT MORPH BLD: NORMAL — SIGNIFICANT CHANGE UP
PLATELET # BLD AUTO: 351 K/UL — SIGNIFICANT CHANGE UP (ref 150–400)
PLATELET # BLD AUTO: 377 K/UL — SIGNIFICANT CHANGE UP (ref 150–400)
PLATELET COUNT - ESTIMATE: NORMAL — SIGNIFICANT CHANGE UP
POLYCHROMASIA BLD QL SMEAR: SLIGHT — SIGNIFICANT CHANGE UP
POTASSIUM SERPL-MCNC: 4.4 MMOL/L — SIGNIFICANT CHANGE UP (ref 3.5–5.3)
POTASSIUM SERPL-SCNC: 4.4 MMOL/L — SIGNIFICANT CHANGE UP (ref 3.5–5.3)
PROT SERPL-MCNC: 6.4 G/DL — SIGNIFICANT CHANGE UP (ref 6–8.3)
RBC # BLD: 3.08 M/UL — LOW (ref 3.8–5.2)
RBC # BLD: 3.21 M/UL — LOW (ref 3.8–5.2)
RBC # FLD: 13.1 % — SIGNIFICANT CHANGE UP (ref 10.3–14.5)
RBC # FLD: 13.2 % — SIGNIFICANT CHANGE UP (ref 10.3–14.5)
RBC BLD AUTO: ABNORMAL
SODIUM SERPL-SCNC: 137 MMOL/L — SIGNIFICANT CHANGE UP (ref 135–145)
SPECIMEN SOURCE: SIGNIFICANT CHANGE UP
VANCOMYCIN FLD-MCNC: 7.6 UG/ML — SIGNIFICANT CHANGE UP
WBC # BLD: 23.41 K/UL — HIGH (ref 3.8–10.5)
WBC # BLD: 28.17 K/UL — HIGH (ref 3.8–10.5)
WBC # FLD AUTO: 23.41 K/UL — HIGH (ref 3.8–10.5)
WBC # FLD AUTO: 28.17 K/UL — HIGH (ref 3.8–10.5)

## 2022-01-10 PROCEDURE — 99223 1ST HOSP IP/OBS HIGH 75: CPT

## 2022-01-10 PROCEDURE — 93970 EXTREMITY STUDY: CPT | Mod: 26

## 2022-01-10 PROCEDURE — 99233 SBSQ HOSP IP/OBS HIGH 50: CPT | Mod: GC

## 2022-01-10 RX ORDER — ACETAMINOPHEN 500 MG
650 TABLET ORAL EVERY 6 HOURS
Refills: 0 | Status: DISCONTINUED | OUTPATIENT
Start: 2022-01-10 | End: 2022-01-12

## 2022-01-10 RX ORDER — CEFEPIME 1 G/1
2000 INJECTION, POWDER, FOR SOLUTION INTRAMUSCULAR; INTRAVENOUS EVERY 24 HOURS
Refills: 0 | Status: DISCONTINUED | OUTPATIENT
Start: 2022-01-10 | End: 2022-01-11

## 2022-01-10 RX ORDER — INFLUENZA VIRUS VACCINE 15; 15; 15; 15 UG/.5ML; UG/.5ML; UG/.5ML; UG/.5ML
0.5 SUSPENSION INTRAMUSCULAR ONCE
Refills: 0 | Status: DISCONTINUED | OUTPATIENT
Start: 2022-01-10 | End: 2022-01-12

## 2022-01-10 RX ORDER — POLYETHYLENE GLYCOL 3350 17 G/17G
17 POWDER, FOR SOLUTION ORAL ONCE
Refills: 0 | Status: COMPLETED | OUTPATIENT
Start: 2022-01-10 | End: 2022-01-10

## 2022-01-10 RX ORDER — CEFEPIME 1 G/1
1000 INJECTION, POWDER, FOR SOLUTION INTRAMUSCULAR; INTRAVENOUS ONCE
Refills: 0 | Status: COMPLETED | OUTPATIENT
Start: 2022-01-10 | End: 2022-01-10

## 2022-01-10 RX ORDER — ONDANSETRON 8 MG/1
4 TABLET, FILM COATED ORAL ONCE
Refills: 0 | Status: COMPLETED | OUTPATIENT
Start: 2022-01-10 | End: 2022-01-10

## 2022-01-10 RX ORDER — CEFEPIME 1 G/1
INJECTION, POWDER, FOR SOLUTION INTRAMUSCULAR; INTRAVENOUS
Refills: 0 | Status: DISCONTINUED | OUTPATIENT
Start: 2022-01-10 | End: 2022-01-10

## 2022-01-10 RX ORDER — MEROPENEM 1 G/30ML
1000 INJECTION INTRAVENOUS ONCE
Refills: 0 | Status: COMPLETED | OUTPATIENT
Start: 2022-01-10 | End: 2022-01-10

## 2022-01-10 RX ORDER — SODIUM CHLORIDE 9 MG/ML
1000 INJECTION, SOLUTION INTRAVENOUS ONCE
Refills: 0 | Status: COMPLETED | OUTPATIENT
Start: 2022-01-10 | End: 2022-01-10

## 2022-01-10 RX ADMIN — POLYETHYLENE GLYCOL 3350 17 GRAM(S): 17 POWDER, FOR SOLUTION ORAL at 06:47

## 2022-01-10 RX ADMIN — HEPARIN SODIUM 5000 UNIT(S): 5000 INJECTION INTRAVENOUS; SUBCUTANEOUS at 00:19

## 2022-01-10 RX ADMIN — SODIUM CHLORIDE 1000 MILLILITER(S): 9 INJECTION, SOLUTION INTRAVENOUS at 23:21

## 2022-01-10 RX ADMIN — HEPARIN SODIUM 5000 UNIT(S): 5000 INJECTION INTRAVENOUS; SUBCUTANEOUS at 13:16

## 2022-01-10 RX ADMIN — Medication 650 MILLIGRAM(S): at 21:25

## 2022-01-10 RX ADMIN — CEFEPIME 100 MILLIGRAM(S): 1 INJECTION, POWDER, FOR SOLUTION INTRAMUSCULAR; INTRAVENOUS at 17:21

## 2022-01-10 RX ADMIN — Medication 650 MILLIGRAM(S): at 00:56

## 2022-01-10 RX ADMIN — MEROPENEM 100 MILLIGRAM(S): 1 INJECTION INTRAVENOUS at 03:24

## 2022-01-10 RX ADMIN — Medication 650 MILLIGRAM(S): at 08:40

## 2022-01-10 RX ADMIN — Medication 650 MILLIGRAM(S): at 07:38

## 2022-01-10 RX ADMIN — HEPARIN SODIUM 5000 UNIT(S): 5000 INJECTION INTRAVENOUS; SUBCUTANEOUS at 21:46

## 2022-01-10 RX ADMIN — ONDANSETRON 4 MILLIGRAM(S): 8 TABLET, FILM COATED ORAL at 13:15

## 2022-01-10 RX ADMIN — Medication 650 MILLIGRAM(S): at 20:25

## 2022-01-10 NOTE — PROGRESS NOTE ADULT - PROBLEM SELECTOR PLAN 3
- Patient found to be hyponatremic with Na 129 on admission  - Likely in acute setting  - s/p 3L fluids  - repeat labs  - s/p maintenance fluids

## 2022-01-10 NOTE — PROVIDER CONTACT NOTE (OTHER) - ACTION/TREATMENT ORDERED:
Team 2 Graham Faye notified. 1 L fluid bolus ordered. Will continue to monitor.
Team 2 Graham Joel notified. Recheck VS in one hour. Will continue to monitor.

## 2022-01-10 NOTE — PROGRESS NOTE ADULT - PROBLEM SELECTOR PLAN 1
- 2/2 infection (cellulitis vs. abscess) 2/2 BBL on 1/2/2022 at [facility] ([place])  - L-side enlarged, erythematous and indurated to greater extent than R-side  - No abscess seen on CT, however cannot r/o cellulitis  - Pt given prophylactic bactrim by plastic surgeon  - Afebrile on admission, but reported F/C prior to admission  - Tachy to 140s on admission  - WBC 19.16 on admission  - Lactate 1.6  - UA negative  - BCx, UCx pending  - s/f cellulitis  - Vanc by trough     - get weight  - Tylenol PRN for fever  - Can c/s Plastics and ID if complications arise  - Repeat CBC, lactate -- WBC and lactate both increased  - Patient febrile to 100.5, given jarad x1  - ID consulted  - Call Dr. Wadsworth

## 2022-01-10 NOTE — CONSULT NOTE ADULT - SUBJECTIVE AND OBJECTIVE BOX
Katie Rodarte MD  PGY-1, Internal Medicine  Pager [phone] ([facility]) / [phone] ([facility])    Consultation Requested by:    Patient is a 26y old  Female who presents with a chief complaint of Sepsis / Brazilian butt lift (10 Sundar 2022 07:24)    HPI:  Patient is a 27 yo F with PMH herniated disc, renal artery enlargement (unclear laterality) presenting with pain and erythema in b/l buttocks and swelling in buttocks, genitals and LE following Brazilian butt lift on 2022. Patient reports associated F/C, N without vomiting, HA, dizziness (predominantly orthostatic), fatigue, and numbness in the affected areas starting 2022. Patient was seen by [facility] in [place] for the procedure. She had followed up with her plastic surgeon a few days ago, who reported that her edema, ecchymosis and erythema were to be expected. Patient was started on bactrim DS prophylactically. Patient has not been able to lie down comfortably on her back and is having pain sitting down. Denies CP, SOB, cough, LAD, abnormal bleeding.    Patient is COVID vaccinated x2 (last dose 2022)    ED course: afebrile, tachy to 140s, lactate 1.6. WBC 19, Cr 2.26, Na 129, UA neg. BCx, UCx pending. CXR clear, CT C/A/P +diffuse subq edema + fatty stranding, w/o fluid to suggestion abscess, and mild hydroureteronephrosis likely 2/2 distended bladder, no stone. s/p vanco x1, cefazolin x1, 2L LR, 1L NS, morphine 4mg x1.  (2022 10:46)    Patient reports feeling [ ]      REVIEW OF SYSTEMS  All review of systems negative, except for those marked:  General:		[] Abnormal:  	[] Night Sweats		[] Fever		[] Weight Loss  Pulmonary/Cough:	[] Abnormal:  Cardiac/Chest Pain:	[] Abnormal:  Gastrointestinal:	[] Abnormal:  Eyes:			[] Abnormal:  ENT:			[] Abnormal:  Dysuria:		[] Abnormal:  Musculoskeletal	:	[] Abnormal:  Endocrine:		[] Abnormal:  Lymph Nodes:		[] Abnormal:  Headache:		[] Abnormal:  Skin:			[] Abnormal:  Allergy/Immune:	[] Abnormal:  Psychiatric:		[] Abnormal:  [] All other review of systems negative  [] Unable to obtain (explain):    Recent Ill Contacts:	[] No	[] Yes:  Recent Travel History:	[] No	[] Yes:  Recent Animal/Insect Exposure/Tick Bites:	[] No	[] Yes:    Allergies    glyceryl thioglycolate (Rash)  No Known Drug Allergies  Shrimp (Hives)    Intolerances      Antimicrobials:      Other Medications:  acetaminophen     Tablet .. 650 milliGRAM(s) Oral every 6 hours PRN  heparin   Injectable 5000 Unit(s) SubCutaneous every 8 hours  influenza   Vaccine 0.5 milliLiter(s) IntraMuscular once  lactated ringers. 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      SOCIAL HISTORY:  unknown    IMMUNIZATIONS  recent COVID vaccination in 2022    Daily Height in cm: 154.94 (2022 14:37)    Daily   Head Circumference:  Vital Signs Last 24 Hrs  T(C): 37.5 (10 Sundar 2022 07:31), Max: 38.1 (10 Sundar 2022 00:25)  T(F): 99.5 (10 Sundar 2022 07:31), Max: 100.5 (10 Sundar 2022 00:25)  HR: 118 (10 Sundar 2022 07:31) (100 - 125)  BP: 117/73 (10 Sundar 2022 07:31) (101/66 - 117/73)  BP(mean): --  RR: 16 (10 Sundar 2022 07:31) (16 - 19)  SpO2: 100% (10 Sundar 2022 07:31) (100% - 100%)    PHYSICAL EXAM  All physical exam findings normal, except for those marked:  General:	Normal: alert, neither acutely nor chronically ill-appearing, well developed/well   .		nourished, no respiratory distress  .		[] Abnormal:  Eyes		Normal: no conjunctival injection, no discharge, no photophobia, intact   .		extraocular movements, sclera not icteric  .		[] Abnormal:  ENT:		Normal: normal tympanic membranes; external ear normal, nares normal without   .		discharge, no pharyngeal erythema or exudates, no oral mucosal lesions, normal   .		tongue and lips  .		[] Abnormal:  Neck		Normal: supple, full range of motion, no nuchal rigidity  .		[] Abnormal:  Lymph Nodes	Normal: normal size and consistency, non-tender  .		[] Abnormal:  Cardiovascular	Normal: regular rate and variability; Normal S1, S2; No murmur  .		[] Abnormal:  Respiratory	Normal: no wheezing or crackles, bilateral audible breath sounds, no retractions  .		[] Abnormal:  Abdominal	Normal: soft; non-distended; non-tender; no hepatosplenomegaly or masses  .		[] Abnormal:  		Normal: normal external genitalia, no rash  .		[] Abnormal:  Extremities	Normal: FROM x4, no cyanosis or edema, symmetric pulses  .		[] Abnormal:  Skin		Normal: skin intact and not indurated; no rash, no desquamation  .		[] Abnormal:  Neurologic	Normal: alert, oriented as age-appropriate, affect appropriate; no weakness, no   .		facial asymmetry, moves all extremities, normal gait-child older than 18 months  .		[] Abnormal:  Musculoskeletal		Normal: no joint swelling, erythema, or tenderness; full range of motion   .			with no contractures; no muscle tenderness; no clubbing; no cyanosis;   .			no edema  .			[] Abnormal    Respiratory Support:		[] No	[] Yes:  Vasoactive medication infusion:	[] No	[] Yes:  Venous catheters:		[] No	[] Yes:  Bladder catheter:		[] No	[] Yes:  Other catheters or tubes:	[] No	[] Yes:    Lab Results:                        9.3    23.41 )-----------( 351      ( 10 Sundar 2022 00:55 )             28.4         133<L>  |  99  |  30<H>  ----------------------------<  113<H>  4.0   |  18<L>  |  2.64<H>    Ca    8.3<L>      2022 17:55  Phos  5.6       Mg     2.40         TPro  7.6  /  Alb  3.3  /  TBili  0.4  /  DBili  x   /  AST  27  /  ALT  28  /  AlkPhos  79      LIVER FUNCTIONS - ( 2022 05:55 )  Alb: 3.3 g/dL / Pro: 7.6 g/dL / ALK PHOS: 79 U/L / ALT: 28 U/L / AST: 27 U/L / GGT: x           PT/INR - ( 2022 05:55 )   PT: 13.9 sec;   INR: 1.23 ratio         PTT - ( 2022 05:55 )  PTT:28.8 sec  Urinalysis Basic - ( 2022 07:17 )    Color: Colorless / Appearance: Clear / S.009 / pH: x  Gluc: x / Ketone: Negative  / Bili: Negative / Urobili: <2 mg/dL   Blood: x / Protein: Negative / Nitrite: Negative   Leuk Esterase: Negative / RBC: x / WBC x   Sq Epi: x / Non Sq Epi: x / Bacteria: x        MICROBIOLOGY    [] Pathology slides reviewed and/or discussed with pathologist  [x] Microbiology findings discussed with microbiologist or slides reviewed  [x] Images reviewed with radiologist  [x] Case discussed with an attending physician in addition to the patient's primary physician  [] Records, reports from outside [facility] reviewed    [x] Patient requires continued monitoring for: infection  [] Total critical care time spent by attending physician: __ minutes, excluding procedure time. Katie Rodarte MD  PGY-1, Internal Medicine  Pager [phone] ([facility]) / [phone] ([facility])    Consultation Requested by: Dr. Peres    Patient is a 26y old  Female who presents with a chief complaint of Sepsis 2/ Brazilian butt lift (10 Sundar 2022 07:24)    HPI:  Patient is a 27 yo F with PMH herniated disc, renal artery enlargement (unclear laterality) presenting with pain and erythema in b/l buttocks and swelling in buttocks, genitals and LE following Brazilian butt lift on 2022. Patient reports associated F/C, N without vomiting, HA, dizziness (predominantly orthostatic), fatigue, and numbness in the affected areas starting 2022. Patient was seen by [facility] in [place] for the procedure. She had followed up with her plastic surgeon a few days ago, who reported that her edema, ecchymosis and erythema were to be expected. Patient was started on bactrim DS prophylactically. Patient has not been able to lie down comfortably on her back and is having pain sitting down. Denies CP, SOB, cough, LAD, abnormal bleeding.    Patient is COVID vaccinated x2 (last dose 2021)    ED course: afebrile, tachy to 140s, lactate 1.6. WBC 19, Cr 2.26, Na 129, UA neg. BCx, UCx pending. CXR clear, CT C/A/P +diffuse subq edema + fatty stranding, w/o fluid to suggestion abscess, and mild hydroureteronephrosis likely 2/2 distended bladder, no stone. s/p vanco x1, cefazolin x1, 2L LR, 1L NS, morphine 4mg x1.  (2022 10:46)    Patient reports feeling [ ]      REVIEW OF SYSTEMS  All review of systems negative, except for those marked:  General:		[] Abnormal:  	[] Night Sweats		[] Fever		[] Weight Loss  Pulmonary/Cough:	[] Abnormal:  Cardiac/Chest Pain:	[] Abnormal:  Gastrointestinal:	[] Abnormal:  Eyes:			[] Abnormal:  ENT:			[] Abnormal:  Dysuria:		[] Abnormal:  Musculoskeletal	:	[] Abnormal:  Endocrine:		[] Abnormal:  Lymph Nodes:		[] Abnormal:  Headache:		[] Abnormal:  Skin:			[] Abnormal:  Allergy/Immune:	[] Abnormal:  Psychiatric:		[] Abnormal:  [] All other review of systems negative  [] Unable to obtain (explain):    Recent Ill Contacts:	[] No	[] Yes:  Recent Travel History:	[] No	[] Yes:  Recent Animal/Insect Exposure/Tick Bites:	[] No	[] Yes:    Allergies    glyceryl thioglycolate (Rash)  No Known Drug Allergies  Shrimp (Hives)    Intolerances      Antimicrobials:      Other Medications:  acetaminophen     Tablet .. 650 milliGRAM(s) Oral every 6 hours PRN  heparin   Injectable 5000 Unit(s) SubCutaneous every 8 hours  influenza   Vaccine 0.5 milliLiter(s) IntraMuscular once  lactated ringers. 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      SOCIAL HISTORY:  unknown    IMMUNIZATIONS  recent COVID vaccination in 2022    Daily Height in cm: 154.94 (2022 14:37)    Daily   Head Circumference:  Vital Signs Last 24 Hrs  T(C): 37.5 (10 Sundar 2022 07:31), Max: 38.1 (10 Sundar 2022 00:25)  T(F): 99.5 (10 Sundar 2022 07:31), Max: 100.5 (10 Sundar 2022 00:25)  HR: 118 (10 Sundar 2022 07:31) (100 - 125)  BP: 117/73 (10 Sundar 2022 07:31) (101/66 - 117/73)  BP(mean): --  RR: 16 (10 Sundar 2022 07:31) (16 - 19)  SpO2: 100% (10 Sundar 2022 07:31) (100% - 100%)    PHYSICAL EXAM  All physical exam findings normal, except for those marked:  General:	Normal: alert, neither acutely nor chronically ill-appearing, well developed/well   .		nourished, no respiratory distress  .		[] Abnormal:  Eyes		Normal: no conjunctival injection, no discharge, no photophobia, intact   .		extraocular movements, sclera not icteric  .		[] Abnormal:  ENT:		Normal: normal tympanic membranes; external ear normal, nares normal without   .		discharge, no pharyngeal erythema or exudates, no oral mucosal lesions, normal   .		tongue and lips  .		[] Abnormal:  Neck		Normal: supple, full range of motion, no nuchal rigidity  .		[] Abnormal:  Lymph Nodes	Normal: normal size and consistency, non-tender  .		[] Abnormal:  Cardiovascular	Normal: regular rate and variability; Normal S1, S2; No murmur  .		[] Abnormal:  Respiratory	Normal: no wheezing or crackles, bilateral audible breath sounds, no retractions  .		[] Abnormal:  Abdominal	Normal: soft; non-distended; non-tender; no hepatosplenomegaly or masses  .		[] Abnormal:  		Normal: normal external genitalia, no rash  .		[] Abnormal:  Extremities	Normal: FROM x4, no cyanosis or edema, symmetric pulses  .		[] Abnormal:  Skin		Normal: skin intact and not indurated; no rash, no desquamation  .		[] Abnormal:  Neurologic	Normal: alert, oriented as age-appropriate, affect appropriate; no weakness, no   .		facial asymmetry, moves all extremities, normal gait-child older than 18 months  .		[] Abnormal:  Musculoskeletal		Normal: no joint swelling, erythema, or tenderness; full range of motion   .			with no contractures; no muscle tenderness; no clubbing; no cyanosis;   .			no edema  .			[] Abnormal    Respiratory Support:		[] No	[] Yes:  Vasoactive medication infusion:	[] No	[] Yes:  Venous catheters:		[] No	[] Yes:  Bladder catheter:		[] No	[] Yes:  Other catheters or tubes:	[] No	[] Yes:    Lab Results:                        9.3    23.41 )-----------( 351      ( 10 Sundar 2022 00:55 )             28.4         133<L>  |  99  |  30<H>  ----------------------------<  113<H>  4.0   |  18<L>  |  2.64<H>    Ca    8.3<L>      2022 17:55  Phos  5.6       Mg     2.40         TPro  7.6  /  Alb  3.3  /  TBili  0.4  /  DBili  x   /  AST  27  /  ALT  28  /  AlkPhos  79      LIVER FUNCTIONS - ( 2022 05:55 )  Alb: 3.3 g/dL / Pro: 7.6 g/dL / ALK PHOS: 79 U/L / ALT: 28 U/L / AST: 27 U/L / GGT: x           PT/INR - ( 2022 05:55 )   PT: 13.9 sec;   INR: 1.23 ratio         PTT - ( 2022 05:55 )  PTT:28.8 sec  Urinalysis Basic - ( 2022 07:17 )    Color: Colorless / Appearance: Clear / S.009 / pH: x  Gluc: x / Ketone: Negative  / Bili: Negative / Urobili: <2 mg/dL   Blood: x / Protein: Negative / Nitrite: Negative   Leuk Esterase: Negative / RBC: x / WBC x   Sq Epi: x / Non Sq Epi: x / Bacteria: x        MICROBIOLOGY    [] Pathology slides reviewed and/or discussed with pathologist  [x] Microbiology findings discussed with microbiologist or slides reviewed  [x] Images reviewed with radiologist  [x] Case discussed with an attending physician in addition to the patient's primary physician  [] Records, reports from outside [facility] reviewed    [x] Patient requires continued monitoring for: sepsis  [] Total critical care time spent by attending physician: __ minutes, excluding procedure time. Katie Rodarte MD  PGY-1, Internal Medicine  Pager [phone] ([facility]) / [phone] ([facility])    Consultation Requested by: Dr. Peres    Patient is a 26y old  Female who presents with a chief complaint of Sepsis 2/2 Brazilian butt lift (10 Sundar 2022 07:24)    HPI:  Patient is a 25 yo F with PMH herniated disc, renal artery enlargement (unclear laterality) presenting with pain and erythema in b/l buttocks and swelling in buttocks, genitals and LE following Brazilian butt lift on 2022. Patient reports associated F/C, N without vomiting, HA, dizziness (predominantly orthostatic), fatigue, and numbness in the affected areas starting 2022. Patient was seen by [facility] in [place] for the procedure. She had followed up with her plastic surgeon a few days ago, who reported that her edema, ecchymosis and erythema were to be expected. Patient was started on bactrim DS prophylactically. Patient has not been able to lie down comfortably on her back and is having pain sitting down. Denies CP, SOB, cough, LAD, abnormal bleeding.    Patient is COVID vaccinated x2 (last dose 2021)    ED course: afebrile, tachy to 140s, lactate 1.6. WBC 19, Cr 2.26, Na 129, UA neg. BCx, UCx pending. CXR clear, CT C/A/P +diffuse subq edema + fatty stranding, w/o fluid to suggestion abscess, and mild hydroureteronephrosis likely 2/2 distended bladder, no stone. s/p vanco x1, cefazolin x1, 2L LR, 1L NS, morphine 4mg x1.  (2022 10:46)    Patient reports having fevers as well as pain in the buttocks. Taking Tylenol helps with the fevers, but not the pain. She is unable to sit normally in a chair and was crouched on the bed during our interview. She notes that her legs and feet are swollen. The pain is diffuse across the abdomen, groin, thighs, and buttocks. She took the Bactrim as prescribed by her plastic surgeon but doesn't think it helped much. Feels nauseated but has not vomited. Taking Zofran helps with the nausea.      REVIEW OF SYSTEMS  All review of systems negative, except for those marked:  General:		[] Abnormal:  	[] Night Sweats		[x] Fever		[] Weight Loss  Pulmonary/Cough:	[x] Abnormal: SOB  Cardiac/Chest Pain:	[] Abnormal:  Gastrointestinal:	[x] Abnormal: nausea  Eyes:			[] Abnormal: denies vision loss, floaters  ENT:			[] Abnormal: denies rhinorrhea, sore throat  Dysuria:		[] Abnormal: denies dysuria, hematuria  Musculoskeletal	:	[] Abnormal:  Endocrine:		[] Abnormal:  Lymph Nodes:		[] Abnormal:  Headache:		[] Abnormal:  Skin:			[x] Abnormal: erythema of groin, BL buttocks, thighs  Allergy/Immune:	[] Abnormal:  Psychiatric:		[] Abnormal: denies hallucinations, depressed mood    Recent Ill Contacts:	[x] No	[] Yes:  Recent Travel History:	[x] No	[] Yes:  Recent Animal/Insect Exposure/Tick Bites:	[x] No	[] Yes:    Allergies    glyceryl thioglycolate (Rash)  No Known Drug Allergies  Shrimp (Hives)    Intolerances      Antimicrobials:  1x each meropenem, vancomycin, and cefazolin      Other Medications:  acetaminophen     Tablet .. 650 milliGRAM(s) Oral every 6 hours PRN  heparin   Injectable 5000 Unit(s) SubCutaneous every 8 hours  influenza   Vaccine 0.5 milliLiter(s) IntraMuscular once  lactated ringers. 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      SOCIAL HISTORY:  lives with brother and parents  never smoker  occasionally drinks alcohol    IMMUNIZATIONS  s/p COVID vaccination    Daily Height in cm: 154.94 (2022 14:37)    Daily   Head Circumference:  Vital Signs Last 24 Hrs  T(C): 37.5 (10 Sundar 2022 07:31), Max: 38.1 (10 Sundar 2022 00:25)  T(F): 99.5 (10 Sundar 2022 07:31), Max: 100.5 (10 Sundar 2022 00:25)  HR: 118 (10 Sundar 2022 07:31) (100 - 125)  BP: 117/73 (10 Sundar 2022 07:31) (101/66 - 117/73)  BP(mean): --  RR: 16 (10 Sundar 2022 07:31) (16 - 19)  SpO2: 100% (10 Sundar 2022 07:31) (100% - 100%)    PHYSICAL EXAM  All physical exam findings normal, except for those marked:  General:	Normal: alert, neither acutely nor chronically ill-appearing, well developed/well   .		nourished, no respiratory distress; remains crouched on bed for most of interview  .		[] Abnormal:  Eyes		Normal: no conjunctival injection, no discharge, no photophobia, intact   .		extraocular movements, sclera not icteric  .		[] Abnormal:  ENT:		Normal: normal tympanic membranes; external ear normal, nares normal without   .		discharge, no pharyngeal erythema or exudates, no oral mucosal lesions, normal   .		tongue and lips  .		[] Abnormal:  Neck		Normal: supple, full range of motion, no nuchal rigidity  .		[] Abnormal:  Lymph Nodes	Normal: normal size and consistency, non-tender  .		[] Abnormal:  Cardiovascular	Normal: regular rate and variability; Normal S1, S2; No murmur  .		[x] Abnormal: tachycardia  Respiratory	Normal: no wheezing or crackles, bilateral audible breath sounds, no retractions  .		[] Abnormal:  Abdominal	Normal: soft; non-distended; no hepatosplenomegaly or masses  .		[] Abnormal: diffuse TTP  		Normal: normal external genitalia  .		[] Abnormal: flat purpura of BL groin  Extremities	Normal: FROM x4, no cyanosis, symmetric pulses  .		[] Abnormal: 1+ pitting edema of BL LLEs  Skin		Normal: skin intact and not indurated; no rash, no desquamation  .		[] Abnormal: BL indurated erythema of lower aspect of buttocks, thighs, groin; scattered purpuric macules on abdomen  Neurologic	Normal: alert, oriented as age-appropriate, affect appropriate; no weakness, no   .		facial asymmetry, moves all extremities, normal gait-child older than 18 months  .		[] Abnormal:  Musculoskeletal		Normal: no joint swelling, erythema, or tenderness; full range of motion   .			with no contractures; no muscle tenderness; no clubbing; no cyanosis;   .			no edema  .			[] Abnormal    Respiratory Support:		[x] No	[] Yes:  Vasoactive medication infusion:	[x] No	[] Yes:  Venous catheters:		[x] No	[] Yes:  Bladder catheter:		[x] No	[] Yes:  Other catheters or tubes:	[] No	[x] Yes: PIV    Lab Results:                        9.3    23.41 )-----------( 351      ( 10 Sundar 2022 00:55 )             28.4         133<L>  |  99  |  30<H>  ----------------------------<  113<H>  4.0   |  18<L>  |  2.64<H>    Ca    8.3<L>      2022 17:55  Phos  5.6       Mg     2.40         TPro  7.6  /  Alb  3.3  /  TBili  0.4  /  DBili  x   /  AST  27  /  ALT  28  /  AlkPhos  79      LIVER FUNCTIONS - ( 2022 05:55 )  Alb: 3.3 g/dL / Pro: 7.6 g/dL / ALK PHOS: 79 U/L / ALT: 28 U/L / AST: 27 U/L / GGT: x           PT/INR - ( 2022 05:55 )   PT: 13.9 sec;   INR: 1.23 ratio         PTT - ( 2022 05:55 )  PTT:28.8 sec  Urinalysis Basic - ( 2022 07:17 )    Color: Colorless / Appearance: Clear / S.009 / pH: x  Gluc: x / Ketone: Negative  / Bili: Negative / Urobili: <2 mg/dL   Blood: x / Protein: Negative / Nitrite: Negative   Leuk Esterase: Negative / RBC: x / WBC x   Sq Epi: x / Non Sq Epi: x / Bacteria: x        MICROBIOLOGY    [] Pathology slides reviewed and/or discussed with pathologist  [x] Microbiology findings discussed with microbiologist or slides reviewed  [x] Images reviewed with radiologist  [x] Case discussed with an attending physician in addition to the patient's primary physician  [] Records, reports from outside [facility] reviewed    [x] Patient requires continued monitoring for: sepsis  [] Total critical care time spent by attending physician: __ minutes, excluding procedure time. Katie Rodarte MD  PGY-1, Internal Medicine  Pager [phone] ([facility]) / [phone] ([facility])    Consultation Requested by: Dr. Peres    Patient is a 26y old  Female who presents with a chief complaint of Sepsis 2/2 Brazilian butt lift (10 Sundar 2022 07:24)    HPI:  Patient is a 25 yo F with PMH herniated disc, renal artery enlargement (unclear laterality) presenting with pain and erythema in b/l buttocks and swelling in buttocks, genitals and LE following Brazilian butt lift on 2022. Patient reports associated F/C, N without vomiting, HA, dizziness (predominantly orthostatic), fatigue, and numbness in the affected areas starting 2022. Patient was seen by [facility] in [place] for the procedure. She had followed up with her plastic surgeon a few days ago, who reported that her edema, ecchymosis and erythema were to be expected. Patient was started on bactrim DS prophylactically. Patient has not been able to lie down comfortably on her back and is having pain sitting down. Denies CP, SOB, cough, LAD, abnormal bleeding.    Patient is COVID vaccinated x2 (last dose 2021)    ED course: afebrile, tachy to 140s, lactate 1.6. WBC 19, Cr 2.26, Na 129, UA neg. BCx, UCx pending. CXR clear, CT C/A/P +diffuse subq edema + fatty stranding, w/o fluid to suggestion abscess, and mild hydroureteronephrosis likely 2/2 distended bladder, no stone. s/p vanco x1, cefazolin x1, 2L LR, 1L NS, morphine 4mg x1.  (2022 10:46)    Patient reports having fevers as well as pain in the buttocks. Taking Tylenol helps with the fevers, but not the pain. She is unable to sit normally in a chair and was crouched on the bed during our interview. She notes that her legs and feet are swollen. The pain is diffuse across the abdomen, groin, thighs, and buttocks. She took the Bactrim as prescribed by her plastic surgeon but doesn't think it helped much. Feels nauseated but has not vomited. Taking Zofran helps with the nausea.      REVIEW OF SYSTEMS  All review of systems negative, except for those marked:  General:		[] Abnormal:  	[] Night Sweats		[x] Fever		[] Weight Loss  Pulmonary/Cough:	[x] Abnormal: SOB  Cardiac/Chest Pain:	[] Abnormal:  Gastrointestinal:	[x] Abnormal: nausea  Eyes:			[] Abnormal: denies vision loss, floaters  ENT:			[] Abnormal: denies rhinorrhea, sore throat  Dysuria:		[] Abnormal: denies dysuria, hematuria  Musculoskeletal	:	[] Abnormal:  Endocrine:		[] Abnormal:  Lymph Nodes:		[] Abnormal:  Headache:		[] Abnormal:  Skin:			[x] Abnormal: erythema of groin, BL buttocks, thighs  Allergy/Immune:	[] Abnormal:  Psychiatric:		[] Abnormal: denies hallucinations, depressed mood    Recent Ill Contacts:	[x] No	[] Yes:  Recent Travel History:	[x] No	[] Yes:  Recent Animal/Insect Exposure/Tick Bites:	[x] No	[] Yes:    Allergies    glyceryl thioglycolate (Rash)  No Known Drug Allergies  Shrimp (Hives)    Intolerances      Antimicrobials:  1x each meropenem, vancomycin, and cefazolin      Other Medications:  acetaminophen     Tablet .. 650 milliGRAM(s) Oral every 6 hours PRN  heparin   Injectable 5000 Unit(s) SubCutaneous every 8 hours  influenza   Vaccine 0.5 milliLiter(s) IntraMuscular once  lactated ringers. 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      SOCIAL HISTORY:  lives with brother and parents  never smoker  occasionally drinks alcohol    IMMUNIZATIONS  s/p COVID vaccination    Daily Height in cm: 154.94 (2022 14:37)    Daily   Head Circumference:  Vital Signs Last 24 Hrs  T(C): 37.5 (10 Sundar 2022 07:31), Max: 38.1 (10 Sundar 2022 00:25)  T(F): 99.5 (10 Sundar 2022 07:31), Max: 100.5 (10 Sundar 2022 00:25)  HR: 118 (10 Sundar 2022 07:31) (100 - 125)  BP: 117/73 (10 Sundar 2022 07:31) (101/66 - 117/73)  BP(mean): --  RR: 16 (10 Sundar 2022 07:31) (16 - 19)  SpO2: 100% (10 Sundar 2022 07:31) (100% - 100%)    PHYSICAL EXAM  All physical exam findings normal, except for those marked:  General:	Normal: alert, neither acutely nor chronically ill-appearing, well developed/well   .		nourished, no respiratory distress; remains crouched on bed for most of interview  .		[] Abnormal:  Eyes		Normal: no conjunctival injection, no discharge, no photophobia, intact   .		extraocular movements, sclera not icteric  .		[] Abnormal:  ENT:		Normal: normal tympanic membranes; external ear normal, nares normal without   .		discharge, no pharyngeal erythema or exudates, no oral mucosal lesions, normal   .		tongue and lips  .		[] Abnormal:  Neck		Normal: supple, full range of motion, no nuchal rigidity  .		[] Abnormal:  Lymph Nodes	Normal: normal size and consistency, non-tender  .		[] Abnormal:  Cardiovascular	Normal: regular rate and variability; Normal S1, S2; No murmur  .		[x] Abnormal: tachycardia  Respiratory	Normal: no wheezing or crackles, bilateral audible breath sounds, no retractions  .		[] Abnormal:  Abdominal	Normal: soft; non-distended; no hepatosplenomegaly or masses  .		[] Abnormal: diffuse TTP  		Normal: normal external genitalia  .		[] Abnormal: flat purpura of BL groin  Extremities	Normal: FROM x4, no cyanosis, symmetric pulses  .		[] Abnormal: 1+ pitting edema of BL LLEs  Skin		Normal: skin intact and not indurated; no rash, no desquamation  .		[] Abnormal: BL indurated erythema of lower aspect of buttocks, thighs, groin; scattered purpuric macules on abdomen; blisters seen in R inguinal area and also inferior of R buttocks  Neurologic	Normal: alert, oriented as age-appropriate, affect appropriate; no weakness, no   .		facial asymmetry, moves all extremities, normal gait-child older than 18 months  .		[] Abnormal:  Musculoskeletal		Normal: no joint swelling, erythema, or tenderness; full range of motion   .			with no contractures; no muscle tenderness; no clubbing; no cyanosis;   .			no edema  .			[] Abnormal    Respiratory Support:		[x] No	[] Yes:  Vasoactive medication infusion:	[x] No	[] Yes:  Venous catheters:		[x] No	[] Yes:  Bladder catheter:		[x] No	[] Yes:  Other catheters or tubes:	[] No	[x] Yes: PIV    Lab Results:                        9.3    23.41 )-----------( 351      ( 10 Sundar 2022 00:55 )             28.4         133<L>  |  99  |  30<H>  ----------------------------<  113<H>  4.0   |  18<L>  |  2.64<H>    Ca    8.3<L>      2022 17:55  Phos  5.6       Mg     2.40         TPro  7.6  /  Alb  3.3  /  TBili  0.4  /  DBili  x   /  AST  27  /  ALT  28  /  AlkPhos  79      LIVER FUNCTIONS - ( 2022 05:55 )  Alb: 3.3 g/dL / Pro: 7.6 g/dL / ALK PHOS: 79 U/L / ALT: 28 U/L / AST: 27 U/L / GGT: x           PT/INR - ( 2022 05:55 )   PT: 13.9 sec;   INR: 1.23 ratio         PTT - ( 2022 05:55 )  PTT:28.8 sec  Urinalysis Basic - ( 2022 07:17 )    Color: Colorless / Appearance: Clear / S.009 / pH: x  Gluc: x / Ketone: Negative  / Bili: Negative / Urobili: <2 mg/dL   Blood: x / Protein: Negative / Nitrite: Negative   Leuk Esterase: Negative / RBC: x / WBC x   Sq Epi: x / Non Sq Epi: x / Bacteria: x        MICROBIOLOGY    [] Pathology slides reviewed and/or discussed with pathologist  [x] Microbiology findings discussed with microbiologist or slides reviewed  [x] Images reviewed with radiologist  [x] Case discussed with an attending physician in addition to the patient's primary physician  [] Records, reports from outside [facility] [place] reviewed    [x] Patient requires continued monitoring for: sepsis  [] Total critical care time spent by attending physician: __ minutes, excluding procedure time. Katie Rodarte MD  PGY-1, Internal Medicine  Pager [phone] ([facility]) / [phone] ([facility])    Consultation Requested by: Dr. Peres    Patient is a 26y old  Female who presents with a chief complaint of Sepsis 2/2 Brazilian butt lift (10 Sundar 2022 07:24)    HPI:  Patient is a 25 yo F with PMH herniated disc, renal artery enlargement (unclear laterality) presenting with pain and erythema in b/l buttocks and swelling in buttocks, genitals and LE following Brazilian butt lift on 2022. Patient reports associated F/C, N without vomiting, HA, dizziness (predominantly orthostatic), fatigue, and numbness in the affected areas starting 2022. Patient was seen by [facility] in [place] for the procedure. She had followed up with her plastic surgeon a few days ago, who reported that her edema, ecchymosis and erythema were to be expected. Patient was started on bactrim DS prophylactically. Patient has not been able to lie down comfortably on her back and is having pain sitting down. Denies CP, SOB, cough, LAD, abnormal bleeding.    Patient is COVID vaccinated x2 (last dose 2021)    ED course: afebrile, tachy to 140s, lactate 1.6. WBC 19, Cr 2.26, Na 129, UA neg. BCx, UCx pending. CXR clear, CT C/A/P +diffuse subq edema + fatty stranding, w/o fluid to suggestion abscess, and mild hydroureteronephrosis likely 2/2 distended bladder, no stone. s/p vanco x1, cefazolin x1, 2L LR, 1L NS, morphine 4mg x1.  (2022 10:46)    Patient reports having fevers as well as pain in the buttocks. Taking Tylenol helps with the fevers, but not the pain. She is unable to sit normally in a chair and was crouched on the bed during our interview. She notes that her legs and feet are swollen. The pain is diffuse across the abdomen, groin, thighs, and buttocks. She took the Bactrim as prescribed by her plastic surgeon but doesn't think it helped much. Feels nauseated but has not vomited. Taking Zofran helps with the nausea.      REVIEW OF SYSTEMS  All review of systems negative, except for those marked:  General:		[] Abnormal:  	[] Night Sweats		[x] Fever		[] Weight Loss  Pulmonary/Cough:	[x] Abnormal: SOB  Cardiac/Chest Pain:	[] Abnormal:  Gastrointestinal:	[x] Abnormal: nausea  Eyes:			[] Abnormal: denies vision loss, floaters  ENT:			[] Abnormal: denies rhinorrhea, sore throat  Dysuria:		[] Abnormal: denies dysuria, hematuria  Musculoskeletal	:	[] Abnormal:  Endocrine:		[] Abnormal:  Lymph Nodes:		[] Abnormal:  Headache:		[] Abnormal:  Skin:			[x] Abnormal: erythema of groin, BL buttocks, thighs  Allergy/Immune:	[] Abnormal:  Psychiatric:		[] Abnormal: denies hallucinations, depressed mood    Recent Ill Contacts:	[x] No	[] Yes:  Recent Travel History:	[x] No	[] Yes:  Recent Animal/Insect Exposure/Tick Bites:	[x] No	[] Yes:    Allergies    glyceryl thioglycolate (Rash)  No Known Drug Allergies  Shrimp (Hives)    Intolerances      Antimicrobials:  1x each meropenem, vancomycin, and cefazolin      Other Medications:  acetaminophen     Tablet .. 650 milliGRAM(s) Oral every 6 hours PRN  heparin   Injectable 5000 Unit(s) SubCutaneous every 8 hours  influenza   Vaccine 0.5 milliLiter(s) IntraMuscular once  lactated ringers. 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      SOCIAL HISTORY:  lives with brother and parents  never smoker  occasionally drinks alcohol    IMMUNIZATIONS  s/p COVID vaccination    Daily Height in cm: 154.94 (2022 14:37)    Daily   Head Circumference:  Vital Signs Last 24 Hrs  T(C): 37.5 (10 Sundar 2022 07:31), Max: 38.1 (10 Sundar 2022 00:25)  T(F): 99.5 (10 Sundar 2022 07:31), Max: 100.5 (10 Sundar 2022 00:25)  HR: 118 (10 Sundar 2022 07:31) (100 - 125)  BP: 117/73 (10 Sundar 2022 07:31) (101/66 - 117/73)  BP(mean): --  RR: 16 (10 Sundar 2022 07:31) (16 - 19)  SpO2: 100% (10 Sundar 2022 07:31) (100% - 100%)    PHYSICAL EXAM  All physical exam findings normal, except for those marked:  General:	Normal: alert, neither acutely nor chronically ill-appearing, well developed/well   .		nourished, no respiratory distress; remains crouched on bed for most of interview  .		[] Abnormal:  Eyes		Normal: no conjunctival injection, sclera not icteric  .		[] Abnormal:  ENT:		Normal: external ear normal, nares normal   .		[] Abnormal:  Neck		Normal: supple, full range of motion, no nuchal rigidity  .		[] Abnormal:  Lymph Nodes	Normal: normal size and consistency, non-tender  .		[] Abnormal:  Cardiovascular	Normal: regular rate and variability; Normal S1, S2; No murmur  .		[x] Abnormal: tachycardia  Respiratory	Normal: no wheezing or crackles, bilateral audible breath sounds, no retractions  .		[] Abnormal:  Abdominal	Normal: soft; non-distended; no hepatosplenomegaly or masses  .		[] Abnormal: diffuse TTP  		Normal: normal external genitalia  .		[] Abnormal: flat purpura of BL groin  Extremities	Normal: FROM x4, no cyanosis, symmetric pulses  .		[] Abnormal: 1+ pitting edema of BL LLEs  Skin		Normal: skin intact and not indurated; no rash, no desquamation  .		[] Abnormal: BL indurated erythema of lower aspect of buttocks, thighs, groin; scattered purpuric macules on abdomen; blisters seen in R inguinal area and also inferior of R buttocks  Neurologic	Normal: alert, oriented as age-appropriate, affect appropriate; no weakness, no   .		facial asymmetry, moves all extremities, normal gait-child older than 18 months  .		[] Abnormal:  Musculoskeletal		Normal: no joint swelling, erythema, or tenderness; full range of motion   .			with no contractures; no muscle tenderness; no clubbing; no cyanosis;   .			no edema  .			[] Abnormal    Respiratory Support:		[x] No	[] Yes:  Vasoactive medication infusion:	[x] No	[] Yes:  Venous catheters:		[x] No	[] Yes:  Bladder catheter:		[x] No	[] Yes:  Other catheters or tubes:	[] No	[x] Yes: PIV    Lab Results:                        9.3    23.41 )-----------( 351      ( 10 Sundar 2022 00:55 )             28.4         133<L>  |  99  |  30<H>  ----------------------------<  113<H>  4.0   |  18<L>  |  2.64<H>    Ca    8.3<L>      2022 17:55  Phos  5.6       Mg     2.40         TPro  7.6  /  Alb  3.3  /  TBili  0.4  /  DBili  x   /  AST  27  /  ALT  28  /  AlkPhos  79      LIVER FUNCTIONS - ( 2022 05:55 )  Alb: 3.3 g/dL / Pro: 7.6 g/dL / ALK PHOS: 79 U/L / ALT: 28 U/L / AST: 27 U/L / GGT: x           PT/INR - ( 2022 05:55 )   PT: 13.9 sec;   INR: 1.23 ratio         PTT - ( 2022 05:55 )  PTT:28.8 sec  Urinalysis Basic - ( 2022 07:17 )    Color: Colorless / Appearance: Clear / S.009 / pH: x  Gluc: x / Ketone: Negative  / Bili: Negative / Urobili: <2 mg/dL   Blood: x / Protein: Negative / Nitrite: Negative   Leuk Esterase: Negative / RBC: x / WBC x   Sq Epi: x / Non Sq Epi: x / Bacteria: x        MICROBIOLOGY  Culture - Blood (collected 22 @ 14:03)  Source: .Blood Blood-Venous  Preliminary Report (01-10-22 @ 15:02):    No growth to date.    Culture - Blood (collected 22 @ 14:03)  Source: .Blood Blood-Peripheral  Preliminary Report (01-10-22 @ 15:02):    No growth to date.    Culture - Urine (collected 22 @ 10:52)  Source: Clean Catch Clean Catch (Midstream)  Final Report (01-10-22 @ 09:14):    <10,000 CFU/mL Normal Urogenital Safia    RADIOLOGY PERSONALLY REVIEWED:   CT Abdomen and Pelvis w/ IV Cont (01.09.22 @ 06:50)   Diffuse areas of subcutaneous edema and fatty stranding, likely   postprocedural in etiology. No drainable fluid collection is seen to   suggest abscess formation. However, underlying cellulitis cannot be   excluded.  Mild bilateral hydroureteronephrosis likely secondary to distended   bladder, no evidence of obstructing stone.

## 2022-01-10 NOTE — CONSULT NOTE ADULT - ATTENDING COMMENTS
Her bilateral gluteal areas do have cellulitic changes but the majority of her surgical sites do not.   Probably skin jade but blood cultures negative, no collection to aspirate and I think instrumentation broadens the list of potential pathogens.   NTM infections less likely based on her clinical history.   I don't think she needs MRSA coverage - lack of purulence/abscess and didn't respond to Bactrim.   I think she just missed adequate Strep coverage but will use Cefepime for now.     Ozzie Galvan MD   Infectious Disease   Pager [phone]   After 5PM and on weekends please page fellow on call or call [phone]

## 2022-01-10 NOTE — PROGRESS NOTE ADULT - SUBJECTIVE AND OBJECTIVE BOX
Internal Medicine   Judy Soto | PGY-1    OVERNIGHT EVENTS:      SUBJECTIVE:       MEDICATIONS  (STANDING):  heparin   Injectable 5000 Unit(s) SubCutaneous every 8 hours  influenza   Vaccine 0.5 milliLiter(s) IntraMuscular once  lactated ringers. 1000 milliLiter(s) (75 mL/Hr) IV Continuous <Continuous>    MEDICATIONS  (PRN):  acetaminophen     Tablet .. 650 milliGRAM(s) Oral every 6 hours PRN Temp greater or equal to 38C (100.4F), Mild Pain (1 - 3)  ondansetron Injectable 4 milliGRAM(s) IV Push once PRN Nausea and/or Vomiting        T(F): 99.5 (01-10-22 @ 07:31), Max: 100.5 (01-10-22 @ 00:25)  HR: 118 (01-10-22 @ 07:31) (100 - 125)  BP: 117/73 (01-10-22 @ 07:31) (101/66 - 117/73)  BP(mean): --  RR: 16 (01-10-22 @ 07:31) (16 - 19)  SpO2: 100% (01-10-22 @ 07:31) (100% - 100%)    PHYSICAL EXAM:     GENERAL: NAD, lying in bed comfortably.  HEAD:  Atraumatic, normocephalic.  EYES: EOMI, PERRLA, conjunctiva and sclera clear, no nystagmus noted.  ENT: Moist mucous membranes,   NECK: Supple. No JVD. Trachea midline.  CHEST/LUNG: CTAB. No rales, rhonchi, wheezing, or rubs. Unlabored respirations.  HEART: RRR, no M/R/G, normal S1/S2.  ABDOMEN: Soft, nontender, nondistended, no organomegaly. Normoactive bowel sounds.  EXTREMITIES:  2+ peripheral pulses b/l, brisk capillary refill. No clubbing, cyanosis, or edema.  MSK: No gross deformities noted.   NEURO:  AAOx3, no focal deficits.   SKIN: No rashes or lesions.  PSYCH: Normal mood, affect.    TELEMETRY:    LABS:                        9.3    23.41 )-----------( 351      ( 10 Sundar 2022 00:55 )             28.4     01-09    133<L>  |  99  |  30<H>  ----------------------------<  113<H>  4.0   |  18<L>  |  2.64<H>    Ca    8.3<L>      09 Jan 2022 17:55  Phos  5.6     01-09  Mg     2.40     01-09    TPro  7.6  /  Alb  3.3  /  TBili  0.4  /  DBili  x   /  AST  27  /  ALT  28  /  AlkPhos  79  01-09        PT/INR - ( 09 Jan 2022 05:55 )   PT: 13.9 sec;   INR: 1.23 ratio         PTT - ( 09 Jan 2022 05:55 )  PTT:28.8 sec    Creatinine Trend: 2.64<--, 2.26<--, 0.67<--  I&O's Summary    09 Jan 2022 07:01  -  10 Sundar 2022 07:00  --------------------------------------------------------  IN: 240 mL / OUT: 0 mL / NET: 240 mL    10 Sundar 2022 07:01  -  10 Sundar 2022 13:00  --------------------------------------------------------  IN: 120 mL / OUT: 0 mL / NET: 120 mL      BNP    RADIOLOGY & ADDITIONAL STUDIES:             Internal Medicine   [facility] | PGY-1    OVERNIGHT EVENTS: Febrile to 100.5, given jarad x1.      SUBJECTIVE: Patient was seen and examined at bedside this morning. Continues to complain of tachycardia, pain in buttocks and numbness in abdomen. Denies nausea/vomiting/diarrhea, shortness of breath, or chest pain. Patient responding appropriately to questions and able to make needs known.       MEDICATIONS  (STANDING):  heparin   Injectable 5000 Unit(s) SubCutaneous every 8 hours  influenza   Vaccine 0.5 milliLiter(s) IntraMuscular once  lactated ringers. 1000 milliLiter(s) (75 mL/Hr) IV Continuous <Continuous>    MEDICATIONS  (PRN):  acetaminophen     Tablet .. 650 milliGRAM(s) Oral every 6 hours PRN Temp greater or equal to 38C (100.4F), Mild Pain (1 - 3)  ondansetron Injectable 4 milliGRAM(s) IV Push once PRN Nausea and/or Vomiting        T(F): 99.5 (01-10-22 @ 07:31), Max: 100.5 (01-10-22 @ 00:25)  HR: 118 (01-10-22 @ 07:31) (100 - 125)  BP: 117/73 (01-10-22 @ 07:31) (101/66 - 117/73)  BP(mean): --  RR: 16 (01-10-22 @ 07:31) (16 - 19)  SpO2: 100% (01-10-22 @ 07:31) (100% - 100%)    PHYSICAL EXAM:     GENERAL: NAD, lying in bed comfortably.  HEAD:  Atraumatic, normocephalic.  CHEST/LUNG: CTAB. No rales, rhonchi, wheezing, or rubs. Unlabored respirations.  HEART: RRR, no M/R/G, normal S1/S2.  ABDOMEN: Tense, diffusely tender. Normoactive bowel sounds.  EXTREMITIES:  2+ peripheral pulses b/l, brisk capillary refill. 2+ PE BLE.   NEURO:  AAOx3.   SKIN: Erythematous, indurated, tender cellulitic changes of b/l buttocks, improved from yesterday (per skin markings), small cluster of blisters on L hip, larger area of burst blisters at center of R-sided cellulitis.  PSYCH: Normal mood, affect.        LABS:                        9.3    23.41 )-----------( 351      ( 10 Sundar 2022 00:55 )             28.4     01-09    133<L>  |  99  |  30<H>  ----------------------------<  113<H>  4.0   |  18<L>  |  2.64<H>    Ca    8.3<L>      09 Jan 2022 17:55  Phos  5.6     01-09  Mg     2.40     01-09    TPro  7.6  /  Alb  3.3  /  TBili  0.4  /  DBili  x   /  AST  27  /  ALT  28  /  AlkPhos  79  01-09        PT/INR - ( 09 Jan 2022 05:55 )   PT: 13.9 sec;   INR: 1.23 ratio         PTT - ( 09 Jan 2022 05:55 )  PTT:28.8 sec    Creatinine Trend: 2.64<--, 2.26<--, 0.67<--  I&O's Summary    09 Jan 2022 07:01  -  10 Sundar 2022 07:00  --------------------------------------------------------  IN: 240 mL / OUT: 0 mL / NET: 240 mL    10 Sundar 2022 07:01  -  10 Sundar 2022 13:00  --------------------------------------------------------  IN: 120 mL / OUT: 0 mL / NET: 120 mL

## 2022-01-10 NOTE — PROGRESS NOTE ADULT - PROBLEM SELECTOR PLAN 4
- 2+ PE today on exam  - Likely 2/2 3rd spacing from vasodilation 2/2 sepsis  - Dopplers negative for DVT

## 2022-01-10 NOTE — PATIENT PROFILE ADULT - FALL HARM RISK - HARM RISK INTERVENTIONS

## 2022-01-10 NOTE — PATIENT PROFILE ADULT - NSPROGENOTHERPROVIDER_GEN_A_NUR
Appt Date:2/17/20  Have you seen Dr. Kelly in the past? no    Have you had any treatment? no If so, where?     Started having neck pain shooting into his shoulder and clavicle. Wants a cortisone injection.        none

## 2022-01-10 NOTE — CONSULT NOTE ADULT - ASSESSMENT
26y F with no pertinent past medical history, presented for 3 days of BL buttocks, genital, and LE erythema, edema, and pain, associated with numbness in the affected region, fevers, chills, fatigue, and orthostatic dizziness. Found in the ED to have tachycardia and leukocytosis. CT a/p showing diffuse subcutaneous edema without any fluid collection amenable to drainage.    ***INCOMPLETE NOTE    #sepsis 2/2 Brazilian butt lift  -s/p meropenem, cefazolin, and vancomycin  -fu BCx  -UCx NGTD (normal jade)  -US of LEs unremarkable (DVT ruled-out)  -UA unremarkable  -PRN Tylenol for fevers  -follow CBC daily  -patient remains actively septic (leukocytosis, fevers, tachycardia, hypotension)      #REGINALD  -Cr uptrending to 2.6 with GFR 24, AG 16, Cr 2.6, phos 5.6  -recommend starting mIVF  -likely pre-renal 2/2 hypotension in setting of sepsis    Case discussed with attending Dr. Galvan. ID team will continue to follow. Please page with any questions.    Katie Rodarte MD  Infectious disease resident  Pager [phone] or TEAMS 26y F with no pertinent past medical history, presented for 3 days of BL buttocks, genital, and LE erythema, edema, and pain, associated with numbness in the affected region, fevers, chills, fatigue, and orthostatic dizziness. Found in the ED to have tachycardia and leukocytosis. CT a/p showing diffuse subcutaneous edema without any fluid collection amenable to drainage.    ***INCOMPLETE NOTE    #sepsis 2/2 Brazilian butt lift  -s/p cefazolin  -on vancomycin (dose per trough) and meropenem  -fu BCx  -UCx NGTD (normal jade)  -US of LEs unremarkable (DVT ruled-out)  -UA unremarkable  -PRN Tylenol for fevers  -follow CBC daily  -patient remains actively septic (leukocytosis, fevers, tachycardia, hypotension)    #REGINALD  -Cr uptrending to 2.6 with GFR 24, AG 16, Cr 2.6, phos 5.6  -recommend starting mIVF  -likely pre-renal 2/2 hypotension in setting of sepsis    Case discussed with attending Dr. Galvan. ID team will continue to follow. Please page with any questions.    Katie Rodarte MD  Infectious disease resident  Pager [phone] or TEAMS 26y F with no pertinent past medical history, presented for 3 days of BL buttocks, genital, and LE erythema, edema, and pain, associated with numbness in the affected region, fevers, chills, fatigue, and orthostatic dizziness. Found in the ED to have tachycardia, fevers, hypotension, leukocytosis. CT a/p showing diffuse subcutaneous edema without any fluid collection amenable to drainage. Infection persisted despite outpatient treatment with Bactrim    ***INCOMPLETE NOTE    #sepsis 2/2 Brazilian butt lift  -s/p cefazolin  -stop vancomycin and meropenem  -start cefepime 2g q24h  -obtain MRSA swab  -check cystatin C to assess renal function  -fu BCx  -UCx NGTD (normal jade)  -US of LEs unremarkable (DVT ruled-out)  -UA unremarkable  -PRN Tylenol for fevers  -follow CBC daily  -patient remains actively septic (leukocytosis, fevers, tachycardia, hypotension)    #REGINALD  -Cr uptrending to 2.6 with GFR 24, AG 16, Cr 2.6, phos 5.6  -recommend starting mIVF  -likely pre-renal 2/2 hypotension in setting of sepsis    Case discussed with attending Dr. Galvan. ID team will continue to follow. Please page with any questions.    Katie Rodarte MD  Infectious disease resident  Pager [phone] or TEAMS 26y F with no pertinent past medical history, presented for 3 days of BL buttocks, genital, and LE erythema, edema, and pain, associated with numbness in the affected region, fevers, chills, fatigue, and orthostatic dizziness. Found in the ED to have tachycardia, fevers, hypotension, leukocytosis. CT a/p showing diffuse subcutaneous edema without any fluid collection amenable to drainage. Infection persisted despite outpatient treatment with Bactrim    #sepsis 2/2 Brazilian butt lift  -s/p cefazolin  -stop vancomycin and meropenem  -start cefepime 2g q24h  -obtain MRSA swab  -check cystatin C to assess renal function  -fu BCx  -UCx NGTD (normal jade)  -US of LEs unremarkable (DVT ruled-out)  -UA unremarkable  -PRN Tylenol for fevers  -follow CBC daily  -patient remains actively septic (leukocytosis, fevers, tachycardia, hypotension)    #REGINALD  -Cr uptrending to 2.6 with GFR 24, AG 16, Cr 2.6, phos 5.6  -recommend starting mIVF  -likely pre-renal 2/2 hypotension in setting of sepsis    Case discussed with attending Dr. Galvan. ID team will continue to follow. Please page with any questions.    Katie Rodarte MD  Infectious disease resident  Pager [phone] or TEAMS

## 2022-01-10 NOTE — PROGRESS NOTE ADULT - ATTENDING COMMENTS
Patient seen and examined. Case discussed with the medical team on rounds. I agree with the findings and the plan above.    Patient is a 25 year old lady with severe sepsis 2/2 [place] butt lift. ID consulted, now on broad spectrum, will adjust antibiotics as per ID recs. No abscess on imaging.   Continue the rest of the work up and management as stated above. Patient seen and examined. Case discussed with the medical team on rounds. I agree with the findings and the plan above.    Patient is a 25 year old lady with severe sepsis 2/2 [place] butt lift. ID consulted, now on broad spectrum, will adjust antibiotics as per ID recs. No abscess on imaging.   REGINALD, likely ATN from severe sepsis, will continue to monitor  Continue the rest of the work up and management as stated above.

## 2022-01-10 NOTE — PROGRESS NOTE ADULT - ASSESSMENT
27 yo F with PMH herniated discs x3 and aneurysmal renal artery (unspecified laterality) presenting with sepsis 2/2 cellulitis vs. abscesses 2/2 BBL, as well as REGINALD, also found to be hyponatremic.

## 2022-01-10 NOTE — PROGRESS NOTE ADULT - PROBLEM SELECTOR PLAN 2
- Cr 2.26 on admission (BL ~0.7)  - likely 2/2 sepsis  - s/p 2L LR, 1L NS in ED  - Avoid nephrotoxic agents  - s/p maintenance LR  - Repeat BMP showed worsened Cr 2.64  - trend BMP

## 2022-01-11 ENCOUNTER — TRANSCRIPTION ENCOUNTER (OUTPATIENT)
Age: 27
End: 2022-01-11

## 2022-01-11 DIAGNOSIS — R19.7 DIARRHEA, UNSPECIFIED: ICD-10-CM

## 2022-01-11 LAB
ALBUMIN SERPL ELPH-MCNC: 2.5 G/DL — LOW (ref 3.3–5)
ALP SERPL-CCNC: 67 U/L — SIGNIFICANT CHANGE UP (ref 40–120)
ALT FLD-CCNC: 19 U/L — SIGNIFICANT CHANGE UP (ref 4–33)
ANION GAP SERPL CALC-SCNC: 13 MMOL/L — SIGNIFICANT CHANGE UP (ref 7–14)
AST SERPL-CCNC: 19 U/L — SIGNIFICANT CHANGE UP (ref 4–32)
BASOPHILS # BLD AUTO: 0.04 K/UL — SIGNIFICANT CHANGE UP (ref 0–0.2)
BASOPHILS NFR BLD AUTO: 0.2 % — SIGNIFICANT CHANGE UP (ref 0–2)
BILIRUB SERPL-MCNC: 0.3 MG/DL — SIGNIFICANT CHANGE UP (ref 0.2–1.2)
BUN SERPL-MCNC: 22 MG/DL — SIGNIFICANT CHANGE UP (ref 7–23)
CALCIUM SERPL-MCNC: 8.4 MG/DL — SIGNIFICANT CHANGE UP (ref 8.4–10.5)
CHLORIDE SERPL-SCNC: 103 MMOL/L — SIGNIFICANT CHANGE UP (ref 98–107)
CO2 SERPL-SCNC: 22 MMOL/L — SIGNIFICANT CHANGE UP (ref 22–31)
CREAT SERPL-MCNC: 1.37 MG/DL — HIGH (ref 0.5–1.3)
CYSTATIN C SERPL-MCNC: 1.31 MG/L — HIGH (ref 0.57–0.9)
EOSINOPHIL # BLD AUTO: 0.2 K/UL — SIGNIFICANT CHANGE UP (ref 0–0.5)
EOSINOPHIL NFR BLD AUTO: 0.8 % — SIGNIFICANT CHANGE UP (ref 0–6)
GFR/BSA.PRED SERPLBLD CYS-BASED-ARV: 58 ML/MIN — LOW
GLUCOSE SERPL-MCNC: 94 MG/DL — SIGNIFICANT CHANGE UP (ref 70–99)
HCT VFR BLD CALC: 24.5 % — LOW (ref 34.5–45)
HGB BLD-MCNC: 8.2 G/DL — LOW (ref 11.5–15.5)
IANC: 22.05 K/UL — HIGH (ref 1.5–8.5)
IMM GRANULOCYTES NFR BLD AUTO: 1.3 % — SIGNIFICANT CHANGE UP (ref 0–1.5)
LYMPHOCYTES # BLD AUTO: 1.66 K/UL — SIGNIFICANT CHANGE UP (ref 1–3.3)
LYMPHOCYTES # BLD AUTO: 6.6 % — LOW (ref 13–44)
MAGNESIUM SERPL-MCNC: 1.8 MG/DL — SIGNIFICANT CHANGE UP (ref 1.6–2.6)
MCHC RBC-ENTMCNC: 30.9 PG — SIGNIFICANT CHANGE UP (ref 27–34)
MCHC RBC-ENTMCNC: 33.5 GM/DL — SIGNIFICANT CHANGE UP (ref 32–36)
MCV RBC AUTO: 92.5 FL — SIGNIFICANT CHANGE UP (ref 80–100)
MONOCYTES # BLD AUTO: 1.07 K/UL — HIGH (ref 0–0.9)
MONOCYTES NFR BLD AUTO: 4.2 % — SIGNIFICANT CHANGE UP (ref 2–14)
MRSA PCR RESULT.: SIGNIFICANT CHANGE UP
NEUTROPHILS # BLD AUTO: 22.05 K/UL — HIGH (ref 1.8–7.4)
NEUTROPHILS NFR BLD AUTO: 86.9 % — HIGH (ref 43–77)
NRBC # BLD: 0 /100 WBCS — SIGNIFICANT CHANGE UP
NRBC # FLD: 0 K/UL — SIGNIFICANT CHANGE UP
PHOSPHATE SERPL-MCNC: 4.5 MG/DL — SIGNIFICANT CHANGE UP (ref 2.5–4.5)
PLATELET # BLD AUTO: 357 K/UL — SIGNIFICANT CHANGE UP (ref 150–400)
POTASSIUM SERPL-MCNC: 3.8 MMOL/L — SIGNIFICANT CHANGE UP (ref 3.5–5.3)
POTASSIUM SERPL-SCNC: 3.8 MMOL/L — SIGNIFICANT CHANGE UP (ref 3.5–5.3)
PROT SERPL-MCNC: 6 G/DL — SIGNIFICANT CHANGE UP (ref 6–8.3)
RBC # BLD: 2.65 M/UL — LOW (ref 3.8–5.2)
RBC # FLD: 13.3 % — SIGNIFICANT CHANGE UP (ref 10.3–14.5)
S AUREUS DNA NOSE QL NAA+PROBE: SIGNIFICANT CHANGE UP
SODIUM SERPL-SCNC: 138 MMOL/L — SIGNIFICANT CHANGE UP (ref 135–145)
WBC # BLD: 25.34 K/UL — HIGH (ref 3.8–10.5)
WBC # FLD AUTO: 25.34 K/UL — HIGH (ref 3.8–10.5)

## 2022-01-11 PROCEDURE — 99232 SBSQ HOSP IP/OBS MODERATE 35: CPT

## 2022-01-11 PROCEDURE — 99232 SBSQ HOSP IP/OBS MODERATE 35: CPT | Mod: GC

## 2022-01-11 RX ORDER — ONDANSETRON 8 MG/1
4 TABLET, FILM COATED ORAL ONCE
Refills: 0 | Status: DISCONTINUED | OUTPATIENT
Start: 2022-01-11 | End: 2022-01-12

## 2022-01-11 RX ORDER — CEFAZOLIN SODIUM 1 G
1000 VIAL (EA) INJECTION EVERY 8 HOURS
Refills: 0 | Status: DISCONTINUED | OUTPATIENT
Start: 2022-01-11 | End: 2022-01-12

## 2022-01-11 RX ADMIN — HEPARIN SODIUM 5000 UNIT(S): 5000 INJECTION INTRAVENOUS; SUBCUTANEOUS at 14:42

## 2022-01-11 RX ADMIN — Medication 650 MILLIGRAM(S): at 14:00

## 2022-01-11 RX ADMIN — CEFEPIME 100 MILLIGRAM(S): 1 INJECTION, POWDER, FOR SOLUTION INTRAMUSCULAR; INTRAVENOUS at 14:42

## 2022-01-11 RX ADMIN — Medication 650 MILLIGRAM(S): at 18:29

## 2022-01-11 RX ADMIN — HEPARIN SODIUM 5000 UNIT(S): 5000 INJECTION INTRAVENOUS; SUBCUTANEOUS at 21:57

## 2022-01-11 RX ADMIN — Medication 650 MILLIGRAM(S): at 03:19

## 2022-01-11 RX ADMIN — Medication 100 MILLIGRAM(S): at 21:56

## 2022-01-11 RX ADMIN — Medication 650 MILLIGRAM(S): at 04:19

## 2022-01-11 RX ADMIN — HEPARIN SODIUM 5000 UNIT(S): 5000 INJECTION INTRAVENOUS; SUBCUTANEOUS at 06:32

## 2022-01-11 RX ADMIN — Medication 650 MILLIGRAM(S): at 12:38

## 2022-01-11 NOTE — DISCHARGE NOTE PROVIDER - NSDCCPCAREPLAN_GEN_ALL_CORE_FT
PRINCIPAL DISCHARGE DIAGNOSIS  Diagnosis: Sepsis  Assessment and Plan of Treatment: You were admitted with sepsis caused by a cellulitis from your recent Brazilian Butt Lift. You were started on antibiotics and given fluids.  ____________  .  Cellulitis  .  Cellulitis is a skin infection caused by bacteria. This condition occurs most often in the arms and lower legs but can occur anywhere over the body. Symptoms include redness, swelling, warm skin, tenderness, and chills/fever. If you were prescribed an antibiotic medicine, take it as told by your health care provider. Do not stop taking the antibiotic even if you start to feel better.  .  SEEK IMMEDIATE MEDICAL CARE IF YOU HAVE ANY OF THE FOLLOWING SYMPTOMS: worsening fever, red streaks coming from affected area, vomiting or diarrhea, or dizziness/lightheadedness.      SECONDARY DISCHARGE DIAGNOSES  Diagnosis: REGINALD (acute kidney injury)  Assessment and Plan of Treatment:      PRINCIPAL DISCHARGE DIAGNOSIS  Diagnosis: Sepsis  Assessment and Plan of Treatment: You were admitted with sepsis caused by a cellulitis from your recent Brazilian Butt Lift. You were started on antibiotics and given fluids. We also spoke with your plastic surgeon, who informed us that your status was most likely due to inflammation from your procedure and that it may take 3-6 months before you have completely healed. Regardless, we continued you on antibiotics for your cellulitis and saw your white counts decrease and overall status improve. However, your swelling, redness, and soreness will take some time given the nature of your surgery. Please continue to take the Keflex we are sending you home with for another 10 days (from 1/13/2022-1/22/2022).  .  Cellulitis  .  Cellulitis is a skin infection caused by bacteria. This condition occurs most often in the arms and lower legs but can occur anywhere over the body. Symptoms include redness, swelling, warm skin, tenderness, and chills/fever. If you were prescribed an antibiotic medicine, take it as told by your health care provider. Do not stop taking the antibiotic even if you start to feel better.  .  SEEK IMMEDIATE MEDICAL CARE IF YOU HAVE ANY OF THE FOLLOWING SYMPTOMS: worsening fever, red streaks coming from affected area, vomiting or diarrhea, or dizziness/lightheadedness.      SECONDARY DISCHARGE DIAGNOSES  Diagnosis: REGINALD (acute kidney injury)  Assessment and Plan of Treatment: You were found to have an acute kidney injury (REGINALD) when you were admitted. Your ability to uriante was not compromised. We gave you fluids and your kidney function improved to normal.     PRINCIPAL DISCHARGE DIAGNOSIS  Diagnosis: Sepsis  Assessment and Plan of Treatment: You were admitted with sepsis caused by a cellulitis from your recent Brazilian Butt Lift. You were started on antibiotics and given fluids. We also spoke with your plastic surgeon, who informed us that your status was most likely due to inflammation from your procedure and that it may take 3-6 months before you have completely healed. Regardless, we continued you on antibiotics for your cellulitis and saw your white counts decrease and overall status improve. However, your swelling, redness, and soreness will take some time given the nature of your surgery. Please continue to take the Keflex we are sending you home to complete a 10 day course as prescribed.  .  Cellulitis  .  Cellulitis is a skin infection caused by bacteria. This condition occurs most often in the arms and lower legs but can occur anywhere over the body. Symptoms include redness, swelling, warm skin, tenderness, and chills/fever. If you were prescribed an antibiotic medicine, take it as told by your health care provider. Do not stop taking the antibiotic even if you start to feel better.  .  SEEK IMMEDIATE MEDICAL CARE IF YOU HAVE ANY OF THE FOLLOWING SYMPTOMS: worsening fever, red streaks coming from affected area, vomiting or diarrhea, or dizziness/lightheadedness.      SECONDARY DISCHARGE DIAGNOSES  Diagnosis: REGINALD (acute kidney injury)  Assessment and Plan of Treatment: You were found to have an acute kidney injury (REGINALD) when you were admitted. Your ability to uriante was not compromised. We gave you fluids and your kidney function improved to normal.

## 2022-01-11 NOTE — PROGRESS NOTE ADULT - PROBLEM SELECTOR PLAN 4
- 2+ PE today on exam  - Likely 2/2 3rd spacing from vasodilation 2/2 sepsis  - Dopplers negative for DVT - Patient found to be hyponatremic with Na 129 on admission  - Likely in acute setting  - s/p 3L fluids  - trend BMP  - s/p maintenance fluids  - s/p 1L LR

## 2022-01-11 NOTE — PROGRESS NOTE ADULT - SUBJECTIVE AND OBJECTIVE BOX
Katie Rodarte MD   PGY-1, Internal Medicine  Pager: [phone] ([facility]) / [phone] ([facility])      Follow Up:  post-op infection    Interval History/ROS:  WBC and ANC downtrending  Cr downtrending  Tmax 100.5 overnight  BCx and UCx NGTD    Patient reports >3 episodes of diarrhea in last 24h. Still feeling fevers and chills. Pain is greatest in the buttocks, soothed by placing a cold pack.    Allergies  glyceryl thioglycolate (Rash)  No Known Drug Allergies  Shrimp (Hives)        ANTIMICROBIALS:  cefepime   IVPB 2000 every 24 hours      OTHER MEDS:  MEDICATIONS  (STANDING):  acetaminophen     Tablet .. 650 every 6 hours PRN  heparin   Injectable 5000 every 8 hours  influenza   Vaccine 0.5 once      Vital Signs Last 24 Hrs  T(C): 37.4 (11 Jan 2022 06:34), Max: 38.1 (10 Sundar 2022 21:25)  T(F): 99.3 (11 Jan 2022 06:34), Max: 100.6 (10 Sundar 2022 21:25)  HR: 122 (11 Jan 2022 06:34) (119 - 136)  BP: 113/65 (11 Jan 2022 06:34) (103/61 - 118/76)  BP(mean): --  RR: 16 (11 Jan 2022 06:34) (16 - 18)  SpO2: 100% (11 Jan 2022 06:34) (97% - 100%)    PHYSICAL EXAM:  Constitutional: non-toxic, no distress  HEAD/EYES: anicteric, no conjunctival injection  ENT:  supple, no thrush  Cardiovascular:   normal S1, S2, no murmur, no edema; tachycardia  Respiratory:  clear BS bilaterally, no wheezes, no rales  GI:  soft, non-tender, normal bowel sounds  :  no heath, no CVA tenderness; purpuric discoloration of BL groin with TTP  Musculoskeletal:  no synovitis, normal ROM  Neurologic: awake and alert, normal strength, no focal findings  Skin:  indurated, tender erythema of BL buttocks and thighs  Extremities: 1+ pitting edema of BL calves, trace edema in BL thighs  Heme/Onc: no lymphadenopathy   Psychiatric:  awake, alert, appropriate mood                                8.2    25.34 )-----------( 357      ( 11 Jan 2022 06:11 )             24.5       01-11    138  |  103  |  22  ----------------------------<  94  3.8   |  22  |  1.37<H>    Ca    8.4      11 Jan 2022 06:11  Phos  4.5     01-11  Mg     1.80     01-11    TPro  6.0  /  Alb  2.5<L>  /  TBili  0.3  /  DBili  x   /  AST  19  /  ALT  19  /  AlkPhos  67  01-11          MICROBIOLOGY:  Vancomycin Level, Random: 7.6 ug/mL (01-10-22 @ 13:39)  v    Culture - Blood (collected 09 Jan 2022 14:03)  Source: .Blood Blood-Venous  Preliminary Report (10 Sundar 2022 15:02):    No growth to date.    Culture - Blood (collected 09 Jan 2022 14:03)  Source: .Blood Blood-Peripheral  Preliminary Report (10 Sundar 2022 15:02):    No growth to date.    Culture - Urine (collected 09 Jan 2022 10:52)  Source: Clean Catch Clean Catch (Midstream)  Final Report (10 Sundar 2022 09:14):    <10,000 CFU/mL Normal Urogenital Safia                    RADIOLOGY:  no new imaging Katie Rodarte MD   PGY-1, Internal Medicine  Pager: [phone] ([facility]) / [phone] ([facility])      Follow Up:  post-op infection    Interval History/ROS:  WBC and ANC downtrending  Cr downtrending  Tmax 100.5 overnight  BCx and UCx NGTD    Patient reports >3 episodes of diarrhea in last 24h. Still feeling fevers and chills. Pain is greatest in the buttocks, soothed by placing a cold pack.    Allergies  glyceryl thioglycolate (Rash)  No Known Drug Allergies  Shrimp (Hives)        ANTIMICROBIALS:  cefepime   IVPB 2000 every 24 hours      OTHER MEDS:  MEDICATIONS  (STANDING):  acetaminophen     Tablet .. 650 every 6 hours PRN  heparin   Injectable 5000 every 8 hours  influenza   Vaccine 0.5 once      Vital Signs Last 24 Hrs  T(C): 37.4 (11 Jan 2022 06:34), Max: 38.1 (10 Sundar 2022 21:25)  T(F): 99.3 (11 Jan 2022 06:34), Max: 100.6 (10 Sundar 2022 21:25)  HR: 122 (11 Jan 2022 06:34) (119 - 136)  BP: 113/65 (11 Jan 2022 06:34) (103/61 - 118/76)  BP(mean): --  RR: 16 (11 Jan 2022 06:34) (16 - 18)  SpO2: 100% (11 Jan 2022 06:34) (97% - 100%)    PHYSICAL EXAM:  Constitutional: non-toxic, no distress  HEAD/EYES: anicteric, no conjunctival injection  Cardiovascular:  tachycardia  Respiratory:  nonlabored   GI:  nondistended   :  no heath, no CVA tenderness; purpuric discoloration of BL groin with TTP  Neurologic: awake and alert, no focal findings  Skin:  erythematous patches of BL buttocks, not classically indurated for cellulitis  Extremities: 1+ pitting edema of BL calves, trace edema in BL thighs  Psychiatric:  awake, alert, appropriate mood                                8.2    25.34 )-----------( 357      ( 11 Jan 2022 06:11 )             24.5       01-11    138  |  103  |  22  ----------------------------<  94  3.8   |  22  |  1.37<H>    Ca    8.4      11 Jan 2022 06:11  Phos  4.5     01-11  Mg     1.80     01-11    TPro  6.0  /  Alb  2.5<L>  /  TBili  0.3  /  DBili  x   /  AST  19  /  ALT  19  /  AlkPhos  67  01-11          MICROBIOLOGY:  Vancomycin Level, Random: 7.6 ug/mL (01-10-22 @ 13:39)    Culture - Blood (collected 09 Jan 2022 14:03)  Source: .Blood Blood-Venous  Preliminary Report (10 Sundar 2022 15:02):    No growth to date.    Culture - Blood (collected 09 Jan 2022 14:03)  Source: .Blood Blood-Peripheral  Preliminary Report (10 Sundar 2022 15:02):    No growth to date.    Culture - Urine (collected 09 Jan 2022 10:52)  Source: Clean Catch Clean Catch (Midstream)  Final Report (10 Sundar 2022 09:14):    <10,000 CFU/mL Normal Urogenital Safia      RADIOLOGY:  Personally reviewed:   CT Abdomen and Pelvis w/ IV Cont (01.09.22 @ 06:50)   Diffuse areas of subcutaneous edema and fatty stranding, likely   postprocedural in etiology. No drainable fluid collection is seen to   suggest abscess formation. However, underlying cellulitis cannot be   excluded.  Mild bilateral hydroureteronephrosis likely secondary to distended   bladder, no evidence of obstructing stone.

## 2022-01-11 NOTE — CHART NOTE - NSCHARTNOTEFT_GEN_A_CORE
Spoke with pt's plastic surgeon, Dr. Wadsworth, [facility] (patient provided his direct cell phone number). Informed him that patient was admitted for sepsis iso recent BBL. He stated he has received many phone calls like this, that he has performed >14,000 BBLs and that about 1/3 of them end up in a similar situation. He stated that her status is likely 2/2 fat rejection/fat necrosis, and that when he saw her in the office only a few days ago, she was afebrile with normal vitals and attributed her skin findings to an allergic reaction. He said this type of reaction will likely not respond to Abx, and if we want to treat empirically we can for the time being (he said not to expect any response to Abx until at least 36 hours anyway). He also said that it will take between 3-6 months after this procedure for pts to heal, so not to expect any immediate changes. He noted that there is no post-procedural lymphatic massage required for this procedure.

## 2022-01-11 NOTE — PROGRESS NOTE ADULT - ATTENDING COMMENTS
Patient seen and examined. Case discussed with the medical team on rounds. I agree with the findings and the plan above.    Patient is a 25 year old lady with severe sepsis 2/2 [place] citizen butt lift. ID consulted, recommending cefepime for now. No abscess on imaging.   Team discussed with patient's plastic surgeon   REGINALD, likely ATN from severe sepsis and some prerenal, improving, will continue to monitor.  Continue the rest of the work up and management as stated above.

## 2022-01-11 NOTE — PROGRESS NOTE ADULT - PROBLEM SELECTOR PLAN 1
- 2/2 infection (cellulitis vs. abscess) 2/2 BBL on 1/2/2022 at [facility] [place] ([place])  - L-side enlarged, erythematous and indurated to greater extent than R-side  - No abscess seen on CT, however cannot r/o cellulitis  - Pt given prophylactic bactrim by plastic surgeon  - Afebrile on admission, but reported F/C prior to admission  - Tachy to 140s on admission  - WBC 19.16 on admission  - Lactate 1.6  - UA negative  - BCx, UCx pending  - s/f cellulitis  - Vanc by trough     - get weight  - Tylenol PRN for fever  - Can c/s Plastics and ID if complications arise  - Repeat CBC, lactate -- WBC and lactate both increased  - Patient febrile to 100.5, given jarad x1  - ID consulted  - Call Dr. Wadsworth - 2/2 infection (cellulitis vs. abscess) 2/2 BBL on 1/2/2022 at [facility] ([place])  - L-side enlarged, erythematous and indurated to greater extent than R-side  - No abscess seen on CT, however cannot r/o cellulitis  - Pt given prophylactic bactrim by plastic surgeon  - Afebrile on admission, but reported F/C prior to admission  - Tachy to 140s on admission  - WBC 19.16 on admission --> continues to rise, with dip 1/11 am, possibly 2/2 dilution  - Lactate 1.6 --> 2.0  - UA negative  - BCx NGTD, UCx neg  - s/f cellulitis  - Tylenol PRN for fever  - Patient febrile O/N 1/10 to 100.5, given jarad x1  - ID consulted, transitioned to cefepime 2g qD  - Spoke to Dr. Wadsworth, stated status 2/2 fat necrosis, would take 3-6 mo to heal

## 2022-01-11 NOTE — DISCHARGE NOTE PROVIDER - NSDCMRMEDTOKEN_GEN_ALL_CORE_FT
doxycycline monohydrate 100 mg oral tablet: 100 milligram(s) orally 2 times a day  ibuprofen 800 mg oral tablet: 800 milligram(s) orally every 6 hours  mupirocin topical: Apply topically to affected area 3 times a day  sulfamethoxazole-trimethoprim DS: orally every 8 hours  Zofran ODT: 8 milligram(s) orally   acetaminophen 325 mg oral tablet: 2 tab(s) orally every 6 hours, As needed, Temp greater or equal to 38C (100.4F), Mild Pain (1 - 3)  cephalexin 500 mg oral capsule: 1 cap(s) orally once a day MDD:500 mg  doxycycline monohydrate 100 mg oral tablet: 100 milligram(s) orally 2 times a day  ibuprofen 800 mg oral tablet: 800 milligram(s) orally every 6 hours  mupirocin topical: Apply topically to affected area 3 times a day  ondansetron 4 mg oral tablet: 1 tab(s) orally once  sulfamethoxazole-trimethoprim DS: orally every 8 hours   acetaminophen 325 mg oral tablet: 2 tab(s) orally every 6 hours, As needed, Temp greater or equal to 38C (100.4F), Mild Pain (1 - 3)  cephalexin 500 mg oral capsule: 1 cap(s) orally every 6 hours  ondansetron 4 mg oral tablet: 1 tab(s) orally once

## 2022-01-11 NOTE — PROGRESS NOTE ADULT - ASSESSMENT
25 yo F with PMH herniated discs x3 and aneurysmal renal artery (unspecified laterality) presenting with sepsis 2/2 cellulitis vs. abscesses 2/2 BBL, as well as REGINALD, also found to be hyponatremic.  25 yo F with PMH herniated discs x3 and aneurysmal renal artery (unspecified laterality) presenting with sepsis 2/2 BBL, as well as REGINALD, also found to be hyponatremic.

## 2022-01-11 NOTE — PROGRESS NOTE ADULT - PROBLEM SELECTOR PLAN 2
- Cr 2.26 on admission (BL ~0.7)  - likely 2/2 sepsis  - s/p 2L LR, 1L NS in ED  - Avoid nephrotoxic agents  - s/p maintenance LR  - Repeat BMP showed worsened Cr 2.64  - trend BMP - Cr 2.26 on admission (BL ~0.7)  - likely 2/2 sepsis  - s/p 2L LR, 1L NS in ED  - Avoid nephrotoxic agents  - s/p maintenance LR  - s/p 1L LR  - initial worsening of Cr, now improving  - trend BMP - Cr 2.26 on admission (BL ~0.7)  - likely 2/2 sepsis  - s/p 2L LR, 1L NS in ED  - Avoid nephrotoxic agents  - s/p maintenance LR  - s/p 1L LR  - initial worsening of Cr, now improving  - trend BMP  - cystatin C, S 1.31  - eGFR by cystatin C 58

## 2022-01-11 NOTE — PROGRESS NOTE ADULT - ASSESSMENT
26y F with no pertinent past medical history, presented for 3 days of BL buttocks, genital, and LE erythema, edema, and pain, associated with numbness in the affected region, fevers, chills, fatigue, and orthostatic dizziness. Found in the ED to have tachycardia, fevers, hypotension, leukocytosis. CT a/p showing diffuse subcutaneous edema without any fluid collection amenable to drainage. Infection failed outpatient treatment with Bactrim    #sepsis 2/2 Brazilian butt lift  -s/p cefazolin, vancomycin, meropenem  -c/w cefepime 2g q24h  -fu MRSA swab  -fu cystatin C to assess renal function  -BCx NGTD  -UCx NGTD (normal jade)  -US of LEs unremarkable (DVT ruled-out)  -UA unremarkable  -PRN Tylenol for fevers  -follow CBC daily  -patient remains actively septic (leukocytosis, fevers, tachycardia, hypotension)    #REGINALD  -Cr downtrending  -recommending c/w mIVF  -likely pre-renal 2/2 hypotension in setting of sepsis    Case discussed with attending Dr. Galvan. ID team will continue to follow. Please page with any questions.    Katie Rodarte MD  Infectious disease resident  Pager [phone] or TEAMS 26y F with no pertinent past medical history, presented for 3 days of BL buttocks, genital, and LE erythema, edema, and pain, associated with numbness in the affected region, fevers, chills, fatigue, and orthostatic dizziness. Found in the ED to have tachycardia, fevers, hypotension, leukocytosis, meeting criteria for sepsis. CT a/p showing diffuse subcutaneous edema without any fluid collection amenable to drainage. Failed outpatient treatment with Bactrim    #sepsis 2/2 Brazilian butt lift  -s/p cefazolin, vancomycin, meropenem  -c/w cefepime 2g q24h  -fu MRSA swab  -fu cystatin C to assess renal function  -BCx NGTD  -UCx NGTD (normal jade)  -US of LEs unremarkable (DVT ruled-out)  -UA unremarkable  -PRN Tylenol for fevers  -follow CBC daily  -patient remains actively septic (leukocytosis, fevers, tachycardia, hypotension)    #REGINALD  -Cr downtrending  -recommend c/w mIVF  -likely pre-renal 2/2 hypotension in setting of sepsis    Case discussed with attending Dr. Galvan. ID team will continue to follow. Please page with any questions.    Katie Rodarte MD  Infectious disease resident  Pager [phone] or TEAMS 26y F with no pertinent past medical history, presented for 3 days of BL buttocks, genital, and LE erythema, edema, and pain, associated with numbness in the affected region, fevers, chills, fatigue, and orthostatic dizziness. Found in the ED to have tachycardia, fevers, hypotension, leukocytosis, meeting criteria for sepsis. CT a/p showing diffuse subcutaneous edema without any fluid collection amenable to drainage. Failed outpatient treatment with Bactrim    #sepsis 2/2 Brazilian butt lift  -s/p cefazolin, vancomycin, meropenem  -c/w cefepime 2g q24h  -fu MRSA swab  -fu cystatin C to assess renal function  -BCx NGTD  -UCx NGTD (normal jade)  -US of LEs unremarkable (DVT ruled-out)  -UA unremarkable  -PRN Tylenol for fevers  -follow CBC daily  -patient remains actively septic (leukocytosis, fevers, tachycardia, hypotension)    #diarrhea  -patient reported >3 episodes of diarrhea in last 24h  -need to rule-out Cdiff  -on contact precautions  -fu CDiff PCR    #REGINALD  -Cr downtrending  -recommend c/w mIVF  -likely pre-renal 2/2 hypotension in setting of sepsis    Case discussed with attending Dr. Galvan. ID team will continue to follow. Please page with any questions.    Katie Rodarte MD  Infectious disease resident  Pager [phone] or TEAMS 26y F with no pertinent past medical history, presented for 3 days of BL buttocks, genital, and LE erythema, edema, and pain, associated with numbness in the affected region, fevers, chills, fatigue, and orthostatic dizziness. Found in the ED to have tachycardia, fevers, hypotension, leukocytosis, meeting criteria for sepsis. CT a/p showing diffuse subcutaneous edema without any fluid collection amenable to drainage. Failed outpatient treatment with Bactrim    #SIRS after Brazilian butt lift  -s/p cefazolin, vancomycin, meropenem, on cefepime   -fu MRSA swab  -fu cystatin C to assess renal function  -BCx NGTD  -UCx NGTD (normal jade)  -US of LEs unremarkable (DVT ruled-out)  -UA unremarkable  -PRN Tylenol for fevers  -follow CBC daily    #diarrhea  -patient reported >3 episodes of diarrhea in last 24h  -need to rule-out Cdiff  -on contact precautions  -fu CDiff PCR    #REGINALD  -Cr downtrending  -recommend c/w mIVF  -likely pre-renal 2/2 hypotension in setting of sepsis    Case discussed with attending Dr. Galvan. ID team will continue to follow. Please page with any questions.    Katie Rodarte MD  Infectious disease resident  Pager [phone] or TEAMS

## 2022-01-11 NOTE — PROGRESS NOTE ADULT - SUBJECTIVE AND OBJECTIVE BOX
Internal Medicine   Judy Soto | PGY-1    OVERNIGHT EVENTS:      SUBJECTIVE:       MEDICATIONS  (STANDING):  cefepime   IVPB 2000 milliGRAM(s) IV Intermittent every 24 hours  heparin   Injectable 5000 Unit(s) SubCutaneous every 8 hours  influenza   Vaccine 0.5 milliLiter(s) IntraMuscular once  lactated ringers. 1000 milliLiter(s) (75 mL/Hr) IV Continuous <Continuous>    MEDICATIONS  (PRN):  acetaminophen     Tablet .. 650 milliGRAM(s) Oral every 6 hours PRN Temp greater or equal to 38C (100.4F), Mild Pain (1 - 3)        T(F): 99.3 (01-11-22 @ 06:34), Max: 100.6 (01-10-22 @ 21:25)  HR: 122 (01-11-22 @ 06:34) (119 - 136)  BP: 113/65 (01-11-22 @ 06:34) (103/61 - 118/76)  BP(mean): --  RR: 16 (01-11-22 @ 06:34) (16 - 18)  SpO2: 100% (01-11-22 @ 06:34) (97% - 100%)    PHYSICAL EXAM:     GENERAL: NAD, lying in bed comfortably.  HEAD:  Atraumatic, normocephalic.  EYES: EOMI, PERRLA, conjunctiva and sclera clear, no nystagmus noted.  ENT: Moist mucous membranes,   NECK: Supple. No JVD. Trachea midline.  CHEST/LUNG: CTAB. No rales, rhonchi, wheezing, or rubs. Unlabored respirations.  HEART: RRR, no M/R/G, normal S1/S2.  ABDOMEN: Soft, nontender, nondistended, no organomegaly. Normoactive bowel sounds.  EXTREMITIES:  2+ peripheral pulses b/l, brisk capillary refill. No clubbing, cyanosis, or edema.  MSK: No gross deformities noted.   NEURO:  AAOx3, no focal deficits.   SKIN: No rashes or lesions.  PSYCH: Normal mood, affect.    TELEMETRY:    LABS:                        8.2    25.34 )-----------( 357      ( 11 Jan 2022 06:11 )             24.5     01-11    138  |  103  |  22  ----------------------------<  94  3.8   |  22  |  1.37<H>    Ca    8.4      11 Jan 2022 06:11  Phos  4.5     01-11  Mg     1.80     01-11    TPro  6.0  /  Alb  2.5<L>  /  TBili  0.3  /  DBili  x   /  AST  19  /  ALT  19  /  AlkPhos  67  01-11      Cystatin-C with eGFR (01.11.22 @ 09:28)   eGFR by Cystatin C: 58 mL/min   Cystatin C, S: 1.31 mg/L       Creatinine Trend: 1.37<--, 2.38<--, 2.64<--, 2.26<--, 0.67<--  I&O's Summary    10 Sundar 2022 07:01  -  11 Jan 2022 07:00  --------------------------------------------------------  IN: 1340 mL / OUT: 0 mL / NET: 1340 mL      BNP    RADIOLOGY & ADDITIONAL STUDIES:             Internal Medicine   Judy Soto | PGY-1    OVERNIGHT EVENTS: CJ      SUBJECTIVE: Patient was seen and examined at bedside this morning. Continues to report tachycardia. Complaining of diarrhea, but states she feels the same today. Reports her abdominal and LE swelling has improved somewhat. Still has abdominal and buttocks tenderness. Denies F/C, nausea/vomiting, headache, shortness of breath, chest pain. Patient responding appropriately to questions and able to make needs known.       MEDICATIONS  (STANDING):  cefepime   IVPB 2000 milliGRAM(s) IV Intermittent every 24 hours  heparin   Injectable 5000 Unit(s) SubCutaneous every 8 hours  influenza   Vaccine 0.5 milliLiter(s) IntraMuscular once  lactated ringers. 1000 milliLiter(s) (75 mL/Hr) IV Continuous <Continuous>    MEDICATIONS  (PRN):  acetaminophen     Tablet .. 650 milliGRAM(s) Oral every 6 hours PRN Temp greater or equal to 38C (100.4F), Mild Pain (1 - 3)        T(F): 99.3 (01-11-22 @ 06:34), Max: 100.6 (01-10-22 @ 21:25)  HR: 122 (01-11-22 @ 06:34) (119 - 136)  BP: 113/65 (01-11-22 @ 06:34) (103/61 - 118/76)  BP(mean): --  RR: 16 (01-11-22 @ 06:34) (16 - 18)  SpO2: 100% (01-11-22 @ 06:34) (97% - 100%)    PHYSICAL EXAM:     GENERAL: NAD, lying prone in bed comfortably.  HEAD:  Atraumatic, normocephalic.  EYES: EOMI, PERRLA, conjunctiva and sclera clear, no nystagmus noted.  ENT: Moist mucous membranes,   NECK: Supple. No JVD. Trachea midline.  CHEST/LUNG: CTAB. No rales, rhonchi, wheezing, or rubs. Unlabored respirations.  HEART: RRR, no M/R/G, normal S1/S2.  ABDOMEN: Soft, nontender, nondistended, no organomegaly. Normoactive bowel sounds.  EXTREMITIES:  2+ peripheral pulses b/l, brisk capillary refill. No clubbing, cyanosis, or edema.  MSK: No gross deformities noted.   NEURO:  AAOx3, no focal deficits.   SKIN: No rashes or lesions.  PSYCH: Normal mood, affect.    TELEMETRY:    LABS:                        8.2    25.34 )-----------( 357      ( 11 Jan 2022 06:11 )             24.5     01-11    138  |  103  |  22  ----------------------------<  94  3.8   |  22  |  1.37<H>    Ca    8.4      11 Jan 2022 06:11  Phos  4.5     01-11  Mg     1.80     01-11    TPro  6.0  /  Alb  2.5<L>  /  TBili  0.3  /  DBili  x   /  AST  19  /  ALT  19  /  AlkPhos  67  01-11      Cystatin-C with eGFR (01.11.22 @ 09:28)   eGFR by Cystatin C: 58 mL/min   Cystatin C, S: 1.31 mg/L       Creatinine Trend: 1.37<--, 2.38<--, 2.64<--, 2.26<--, 0.67<--  I&O's Summary    10 Sundar 2022 07:01  -  11 Jan 2022 07:00  --------------------------------------------------------  IN: 1340 mL / OUT: 0 mL / NET: 1340 mL      BNP    RADIOLOGY & ADDITIONAL STUDIES:             Internal Medicine   Judy Soto | PGY-1    OVERNIGHT EVENTS: CJ      SUBJECTIVE: Patient was seen and examined at bedside this morning. Continues to report tachycardia. Complaining of diarrhea, but states she feels the same today. Reports her abdominal and LE swelling has improved somewhat. Still has abdominal and buttocks tenderness. Denies F/C, nausea/vomiting, headache, shortness of breath, chest pain. Patient responding appropriately to questions and able to make needs known.       MEDICATIONS  (STANDING):  cefepime   IVPB 2000 milliGRAM(s) IV Intermittent every 24 hours  heparin   Injectable 5000 Unit(s) SubCutaneous every 8 hours  influenza   Vaccine 0.5 milliLiter(s) IntraMuscular once  lactated ringers. 1000 milliLiter(s) (75 mL/Hr) IV Continuous <Continuous>    MEDICATIONS  (PRN):  acetaminophen     Tablet .. 650 milliGRAM(s) Oral every 6 hours PRN Temp greater or equal to 38C (100.4F), Mild Pain (1 - 3)        T(F): 99.3 (01-11-22 @ 06:34), Max: 100.6 (01-10-22 @ 21:25)  HR: 122 (01-11-22 @ 06:34) (119 - 136)  BP: 113/65 (01-11-22 @ 06:34) (103/61 - 118/76)  BP(mean): --  RR: 16 (01-11-22 @ 06:34) (16 - 18)  SpO2: 100% (01-11-22 @ 06:34) (97% - 100%)    PHYSICAL EXAM:     GENERAL: NAD, lying prone in bed comfortably.  HEAD:  Atraumatic, normocephalic.  CHEST/LUNG: CTAB. No rales, rhonchi, wheezing, or rubs. Unlabored respirations.  HEART: RRR, no M/R/G, normal S1/S2.  ABDOMEN: Tense, diffusely tender. Normoactive bowel sounds.  EXTREMITIES:  2+ radial, 1+ DP pulses b/l. 1+ PE in BLE.  SKIN: Erythema, ecchymosis on b/l buttocks, appears increased to the same size as on presentation.  PSYCH: Normal mood, affect.      LABS:                        8.2    25.34 )-----------( 357      ( 11 Jan 2022 06:11 )             24.5     01-11    138  |  103  |  22  ----------------------------<  94  3.8   |  22  |  1.37<H>    Ca    8.4      11 Jan 2022 06:11  Phos  4.5     01-11  Mg     1.80     01-11    TPro  6.0  /  Alb  2.5<L>  /  TBili  0.3  /  DBili  x   /  AST  19  /  ALT  19  /  AlkPhos  67  01-11      Cystatin-C with eGFR (01.11.22 @ 09:28)   eGFR by Cystatin C: 58 mL/min   Cystatin C, S: 1.31 mg/L       Creatinine Trend: 1.37<--, 2.38<--, 2.64<--, 2.26<--, 0.67<--  I&O's Summary    10 Sundar 2022 07:01  -  11 Jan 2022 07:00  --------------------------------------------------------  IN: 1340 mL / OUT: 0 mL / NET: 1340 mL      BNP    RADIOLOGY & ADDITIONAL STUDIES:             Internal Medicine   Judy Soto | PGY-1    OVERNIGHT EVENTS: Febrile to 100.6 overnight.      SUBJECTIVE: Patient was seen and examined at bedside this morning. Continues to report tachycardia. Complaining of diarrhea, but states she feels the same today. Reports her abdominal and LE swelling has improved somewhat. Still has abdominal and buttocks tenderness. Denies F/C, nausea/vomiting, headache, shortness of breath, chest pain. Patient responding appropriately to questions and able to make needs known.       MEDICATIONS  (STANDING):  cefepime   IVPB 2000 milliGRAM(s) IV Intermittent every 24 hours  heparin   Injectable 5000 Unit(s) SubCutaneous every 8 hours  influenza   Vaccine 0.5 milliLiter(s) IntraMuscular once  lactated ringers. 1000 milliLiter(s) (75 mL/Hr) IV Continuous <Continuous>    MEDICATIONS  (PRN):  acetaminophen     Tablet .. 650 milliGRAM(s) Oral every 6 hours PRN Temp greater or equal to 38C (100.4F), Mild Pain (1 - 3)        T(F): 99.3 (01-11-22 @ 06:34), Max: 100.6 (01-10-22 @ 21:25)  HR: 122 (01-11-22 @ 06:34) (119 - 136)  BP: 113/65 (01-11-22 @ 06:34) (103/61 - 118/76)  BP(mean): --  RR: 16 (01-11-22 @ 06:34) (16 - 18)  SpO2: 100% (01-11-22 @ 06:34) (97% - 100%)    PHYSICAL EXAM:     GENERAL: NAD, lying prone in bed comfortably.  HEAD:  Atraumatic, normocephalic.  CHEST/LUNG: CTAB. No rales, rhonchi, wheezing, or rubs. Unlabored respirations.  HEART: RRR, no M/R/G, normal S1/S2.  ABDOMEN: Tense, diffusely tender. Normoactive bowel sounds.  EXTREMITIES:  2+ radial, 1+ DP pulses b/l. 1+ PE in BLE.  SKIN: Erythema, ecchymosis on b/l buttocks, appears increased to the same size as on presentation.  PSYCH: Normal mood, affect.      LABS:                        8.2    25.34 )-----------( 357      ( 11 Jan 2022 06:11 )             24.5     01-11    138  |  103  |  22  ----------------------------<  94  3.8   |  22  |  1.37<H>    Ca    8.4      11 Jan 2022 06:11  Phos  4.5     01-11  Mg     1.80     01-11    TPro  6.0  /  Alb  2.5<L>  /  TBili  0.3  /  DBili  x   /  AST  19  /  ALT  19  /  AlkPhos  67  01-11      Cystatin-C with eGFR (01.11.22 @ 09:28)   eGFR by Cystatin C: 58 mL/min   Cystatin C, S: 1.31 mg/L       Creatinine Trend: 1.37<--, 2.38<--, 2.64<--, 2.26<--, 0.67<--  I&O's Summary    10 Sundar 2022 07:01  -  11 Jan 2022 07:00  --------------------------------------------------------  IN: 1340 mL / OUT: 0 mL / NET: 1340 mL               Internal Medicine   Judy Soto | PGY-1    OVERNIGHT EVENTS: Febrile to 100.6 overnight.      SUBJECTIVE: Patient was seen and examined at bedside this morning. Continues to report tachycardia. Complaining of diarrhea, but states she feels the same today. Reports her abdominal and LE swelling has improved somewhat. Still has abdominal and buttocks tenderness. Denies F/C, nausea/vomiting, headache, shortness of breath, chest pain. Patient responding appropriately to questions and able to make needs known.       MEDICATIONS  (STANDING):  cefepime   IVPB 2000 milliGRAM(s) IV Intermittent every 24 hours  heparin   Injectable 5000 Unit(s) SubCutaneous every 8 hours  influenza   Vaccine 0.5 milliLiter(s) IntraMuscular once  lactated ringers. 1000 milliLiter(s) (75 mL/Hr) IV Continuous <Continuous>    MEDICATIONS  (PRN):  acetaminophen     Tablet .. 650 milliGRAM(s) Oral every 6 hours PRN Temp greater or equal to 38C (100.4F), Mild Pain (1 - 3)        T(F): 99.3 (01-11-22 @ 06:34), Max: 100.6 (01-10-22 @ 21:25)  HR: 122 (01-11-22 @ 06:34) (119 - 136)  BP: 113/65 (01-11-22 @ 06:34) (103/61 - 118/76)  BP(mean): --  RR: 16 (01-11-22 @ 06:34) (16 - 18)  SpO2: 100% (01-11-22 @ 06:34) (97% - 100%)    PHYSICAL EXAM:     GENERAL: NAD, lying prone in bed comfortably.  HEAD:  Atraumatic, normocephalic.  CHEST/LUNG: CTAB. No rales, rhonchi, wheezing, or rubs. Unlabored respirations.  HEART: Tachycardic, no M/R/G, normal S1/S2.  ABDOMEN: Tense, diffusely tender. Normoactive bowel sounds.  EXTREMITIES:  2+ radial, 1+ DP pulses b/l. 1+ PE in BLE.  SKIN: Erythema, ecchymosis on b/l buttocks, appears increased to the same size as on presentation.  PSYCH: Normal mood, affect.      LABS:                        8.2    25.34 )-----------( 357      ( 11 Jan 2022 06:11 )             24.5     01-11    138  |  103  |  22  ----------------------------<  94  3.8   |  22  |  1.37<H>    Ca    8.4      11 Jan 2022 06:11  Phos  4.5     01-11  Mg     1.80     01-11    TPro  6.0  /  Alb  2.5<L>  /  TBili  0.3  /  DBili  x   /  AST  19  /  ALT  19  /  AlkPhos  67  01-11      Cystatin-C with eGFR (01.11.22 @ 09:28)   eGFR by Cystatin C: 58 mL/min   Cystatin C, S: 1.31 mg/L       Creatinine Trend: 1.37<--, 2.38<--, 2.64<--, 2.26<--, 0.67<--  I&O's Summary    10 Sundar 2022 07:01  -  11 Jan 2022 07:00  --------------------------------------------------------  IN: 1340 mL / OUT: 0 mL / NET: 1340 mL

## 2022-01-11 NOTE — DISCHARGE NOTE PROVIDER - PROVIDER TOKENS
Focal Point:ODMulti-focal PROVIDER:[TOKEN:[[id number]],FOLLOWUP:[2 weeks],ESTABLISHEDPATIENT:[T]] Consent (Lip)/Introductory Paragraph: The rationale for Mohs was explained to the patient and consent was obtained. The risks, benefits and alternatives to therapy were discussed in detail. Specifically, the risks of lip deformity, changes in the oral aperture, infection, scarring, bleeding, prolonged wound healing, incomplete removal, allergy to anesthesia, nerve injury and recurrence were addressed. Prior to the procedure, the treatment site was clearly identified and confirmed by the patient. All components of Universal Protocol/PAUSE Rule completed.

## 2022-01-11 NOTE — DISCHARGE NOTE PROVIDER - HOSPITAL COURSE
25 yo F with PMH herniated discs x3 and aneurysmal renal artery (unspecified laterality) presenting with sepsis 2/2 BBL, as well as REGINALD, also found to be hyponatremic.  Patient started on broad spectrum antiobiotics with increasing WBC and worsening clinical status, and ID consulted - transitioned patient to cefepime and later narrowed to Ancef. Per patient's plastic surgeon, pt status likely 2/2 fat necrosis. 27 yo F with PMH herniated discs x3 and aneurysmal renal artery (unspecified laterality) presenting with sepsis 2/2 BBL, as well as REGINALD, also found to be hyponatremic.  Patient started on broad spectrum antiobiotics with increasing WBC and worsening clinical status, and ID consulted - transitioned patient to cefepime and later narrowed to Ancef. Per patient's plastic surgeon, pt status likely 2/2 fat necrosis. Patient also c/o acute diarrhea, C. diff negative. Diarrhea resolved by 1/12/22. Patient is now hemodynamically stable and medically optimized for discharge home on PO Keflex 500mg q6 x10 days with referrals to appropriate clinics.

## 2022-01-11 NOTE — PROGRESS NOTE ADULT - ATTENDING COMMENTS
SIRS after Brazilian butt lift 1/2/22 in [place].   Her surgeon feels this is due to fat necrosis/rejection rather than infection.   There are patches of erythema over each butt cheek but it's not a very classic cellulitic appearance.   Blood cultures negative.   No drainable collections.   No open wounds.   Possible that this is mostly an inflammatory process but I think she warrants a course of antibiotics out of caution.   Given diarrhea, narrow Cefepime to Ancef 1GM IV q8h and check for C diff.     Discussed with medicine   I will be away tomorrow, returning Thurs. If follow up is needed tomorrow please page on call fellow.     Ozzie Galvan MD   Infectious Disease   Pager [phone]   After 5PM and on weekends please page fellow on call or call [phone]

## 2022-01-11 NOTE — PROGRESS NOTE ADULT - PROBLEM SELECTOR PLAN 5
- Bladder distension w/o obstruction stone seen on CT   - Pt urinating frequently  - Bladder scan post-void - 2+ PE today on exam  - Likely 2/2 3rd spacing from vasodilation 2/2 sepsis  - Dopplers negative for DVT

## 2022-01-11 NOTE — PROGRESS NOTE ADULT - PROBLEM SELECTOR PLAN 6
DVT PPx: Hep subq  Diet: Regular  Dispo: - Bladder distension w/o obstruction stone seen on CT   - Pt urinating frequently  - Bladder scan post-void

## 2022-01-11 NOTE — PROGRESS NOTE ADULT - PROBLEM SELECTOR PLAN 3
- Patient found to be hyponatremic with Na 129 on admission  - Likely in acute setting  - s/p 3L fluids  - repeat labs  - s/p maintenance fluids - acute onset diarrhea since yesterday  - C. diff pending - would help explain WBC

## 2022-01-11 NOTE — DISCHARGE NOTE PROVIDER - CARE PROVIDER_API CALL
ABIGAIL LUNA  Plastic Surgery  Phone: [phone]  Fax: [phone]  Established Patient  Follow Up Time: 2 weeks

## 2022-01-11 NOTE — DISCHARGE NOTE PROVIDER - NSFOLLOWUPCLINICS_GEN_ALL_ED_FT
[facility] General Internal Medicine  General Internal Medicine  [address] [id number]  Phone: [phone]  Fax:   Follow Up Time: 2 weeks

## 2022-01-12 ENCOUNTER — TRANSCRIPTION ENCOUNTER (OUTPATIENT)
Age: 27
End: 2022-01-12

## 2022-01-12 VITALS
SYSTOLIC BLOOD PRESSURE: 119 MMHG | OXYGEN SATURATION: 100 % | HEART RATE: 120 BPM | RESPIRATION RATE: 16 BRPM | DIASTOLIC BLOOD PRESSURE: 88 MMHG | TEMPERATURE: 98 F

## 2022-01-12 LAB
ALBUMIN SERPL ELPH-MCNC: 2.6 G/DL — LOW (ref 3.3–5)
ALP SERPL-CCNC: 66 U/L — SIGNIFICANT CHANGE UP (ref 40–120)
ALT FLD-CCNC: 18 U/L — SIGNIFICANT CHANGE UP (ref 4–33)
ANION GAP SERPL CALC-SCNC: 13 MMOL/L — SIGNIFICANT CHANGE UP (ref 7–14)
AST SERPL-CCNC: 18 U/L — SIGNIFICANT CHANGE UP (ref 4–32)
BASOPHILS # BLD AUTO: 0.05 K/UL — SIGNIFICANT CHANGE UP (ref 0–0.2)
BASOPHILS NFR BLD AUTO: 0.3 % — SIGNIFICANT CHANGE UP (ref 0–2)
BILIRUB SERPL-MCNC: 0.4 MG/DL — SIGNIFICANT CHANGE UP (ref 0.2–1.2)
BLD GP AB SCN SERPL QL: NEGATIVE — SIGNIFICANT CHANGE UP
BUN SERPL-MCNC: 13 MG/DL — SIGNIFICANT CHANGE UP (ref 7–23)
C DIFF BY PCR RESULT: SIGNIFICANT CHANGE UP
C DIFF TOX GENS STL QL NAA+PROBE: SIGNIFICANT CHANGE UP
CALCIUM SERPL-MCNC: 8.5 MG/DL — SIGNIFICANT CHANGE UP (ref 8.4–10.5)
CHLORIDE SERPL-SCNC: 105 MMOL/L — SIGNIFICANT CHANGE UP (ref 98–107)
CO2 SERPL-SCNC: 23 MMOL/L — SIGNIFICANT CHANGE UP (ref 22–31)
CREAT SERPL-MCNC: 0.87 MG/DL — SIGNIFICANT CHANGE UP (ref 0.5–1.3)
EOSINOPHIL # BLD AUTO: 0.35 K/UL — SIGNIFICANT CHANGE UP (ref 0–0.5)
EOSINOPHIL NFR BLD AUTO: 2 % — SIGNIFICANT CHANGE UP (ref 0–6)
GLUCOSE SERPL-MCNC: 79 MG/DL — SIGNIFICANT CHANGE UP (ref 70–99)
HCT VFR BLD CALC: 25.3 % — LOW (ref 34.5–45)
HGB BLD-MCNC: 8.6 G/DL — LOW (ref 11.5–15.5)
IANC: 13.49 K/UL — HIGH (ref 1.5–8.5)
IMM GRANULOCYTES NFR BLD AUTO: 1.1 % — SIGNIFICANT CHANGE UP (ref 0–1.5)
LYMPHOCYTES # BLD AUTO: 14.1 % — SIGNIFICANT CHANGE UP (ref 13–44)
LYMPHOCYTES # BLD AUTO: 2.47 K/UL — SIGNIFICANT CHANGE UP (ref 1–3.3)
MAGNESIUM SERPL-MCNC: 1.3 MG/DL — LOW (ref 1.6–2.6)
MCHC RBC-ENTMCNC: 30.7 PG — SIGNIFICANT CHANGE UP (ref 27–34)
MCHC RBC-ENTMCNC: 34 GM/DL — SIGNIFICANT CHANGE UP (ref 32–36)
MCV RBC AUTO: 90.4 FL — SIGNIFICANT CHANGE UP (ref 80–100)
MONOCYTES # BLD AUTO: 0.9 K/UL — SIGNIFICANT CHANGE UP (ref 0–0.9)
MONOCYTES NFR BLD AUTO: 5.2 % — SIGNIFICANT CHANGE UP (ref 2–14)
NEUTROPHILS # BLD AUTO: 13.49 K/UL — HIGH (ref 1.8–7.4)
NEUTROPHILS NFR BLD AUTO: 77.3 % — HIGH (ref 43–77)
NRBC # BLD: 0 /100 WBCS — SIGNIFICANT CHANGE UP
NRBC # FLD: 0 K/UL — SIGNIFICANT CHANGE UP
PHOSPHATE SERPL-MCNC: 4.9 MG/DL — HIGH (ref 2.5–4.5)
PLATELET # BLD AUTO: 378 K/UL — SIGNIFICANT CHANGE UP (ref 150–400)
POTASSIUM SERPL-MCNC: 3.9 MMOL/L — SIGNIFICANT CHANGE UP (ref 3.5–5.3)
POTASSIUM SERPL-SCNC: 3.9 MMOL/L — SIGNIFICANT CHANGE UP (ref 3.5–5.3)
PROT SERPL-MCNC: 5.9 G/DL — LOW (ref 6–8.3)
RBC # BLD: 2.8 M/UL — LOW (ref 3.8–5.2)
RBC # FLD: 13 % — SIGNIFICANT CHANGE UP (ref 10.3–14.5)
RH IG SCN BLD-IMP: POSITIVE — SIGNIFICANT CHANGE UP
SODIUM SERPL-SCNC: 141 MMOL/L — SIGNIFICANT CHANGE UP (ref 135–145)
WBC # BLD: 17.46 K/UL — HIGH (ref 3.8–10.5)
WBC # FLD AUTO: 17.46 K/UL — HIGH (ref 3.8–10.5)

## 2022-01-12 PROCEDURE — 99232 SBSQ HOSP IP/OBS MODERATE 35: CPT

## 2022-01-12 PROCEDURE — 99239 HOSP IP/OBS DSCHRG MGMT >30: CPT | Mod: GC

## 2022-01-12 RX ORDER — SODIUM CHLORIDE 9 MG/ML
1000 INJECTION, SOLUTION INTRAVENOUS
Refills: 0 | Status: DISCONTINUED | OUTPATIENT
Start: 2022-01-12 | End: 2022-01-12

## 2022-01-12 RX ORDER — MUPIROCIN 20 MG/G
0 OINTMENT TOPICAL
Qty: 0 | Refills: 0 | DISCHARGE

## 2022-01-12 RX ORDER — MAGNESIUM SULFATE 500 MG/ML
2 VIAL (ML) INJECTION ONCE
Refills: 0 | Status: COMPLETED | OUTPATIENT
Start: 2022-01-12 | End: 2022-01-12

## 2022-01-12 RX ORDER — ONDANSETRON 8 MG/1
1 TABLET, FILM COATED ORAL
Qty: 0 | Refills: 0 | DISCHARGE
Start: 2022-01-12

## 2022-01-12 RX ORDER — ONDANSETRON 8 MG/1
8 TABLET, FILM COATED ORAL
Qty: 0 | Refills: 0 | DISCHARGE

## 2022-01-12 RX ORDER — IBUPROFEN 200 MG
800 TABLET ORAL
Qty: 0 | Refills: 0 | DISCHARGE

## 2022-01-12 RX ORDER — ACETAMINOPHEN 500 MG
2 TABLET ORAL
Qty: 0 | Refills: 0 | DISCHARGE
Start: 2022-01-12

## 2022-01-12 RX ADMIN — Medication 100 GRAM(S): at 07:49

## 2022-01-12 RX ADMIN — Medication 650 MILLIGRAM(S): at 14:10

## 2022-01-12 RX ADMIN — HEPARIN SODIUM 5000 UNIT(S): 5000 INJECTION INTRAVENOUS; SUBCUTANEOUS at 13:21

## 2022-01-12 RX ADMIN — Medication 650 MILLIGRAM(S): at 13:21

## 2022-01-12 RX ADMIN — SODIUM CHLORIDE 75 MILLILITER(S): 9 INJECTION, SOLUTION INTRAVENOUS at 07:47

## 2022-01-12 RX ADMIN — Medication 100 MILLIGRAM(S): at 13:22

## 2022-01-12 RX ADMIN — Medication 100 MILLIGRAM(S): at 06:10

## 2022-01-12 RX ADMIN — Medication 650 MILLIGRAM(S): at 07:13

## 2022-01-12 RX ADMIN — Medication 650 MILLIGRAM(S): at 02:07

## 2022-01-12 RX ADMIN — Medication 650 MILLIGRAM(S): at 01:07

## 2022-01-12 NOTE — PROGRESS NOTE ADULT - ASSESSMENT
26y F with no pertinent past medical history, presented for 3 days of BL buttocks, genital, and LE erythema, edema, and pain, associated with numbness in the affected region, fevers, chills, fatigue, and orthostatic dizziness. Found in the ED to have tachycardia, fevers, hypotension, leukocytosis, meeting criteria for sepsis. CT a/p showing diffuse subcutaneous edema without any fluid collection amenable to drainage. Failed outpatient treatment with Bactrim    #SIRS after Brazilian butt lift  -s/p cefazolin, vancomycin, meropenem  -c/w cefepime   -MRSA swab negative  -GFR per Cystatin C is 58 (adequate)  -BCx NGTD  -UCx NGTD (normal jade)  -US of LEs unremarkable (DVT ruled-out)  -UA unremarkable  -PRN Tylenol for fevers  -follow CBC daily  -if CDiff negative, recommend DC regimen of PO Keflex for total antibiotics duration of 10 days    #diarrhea  -concerning for possible CDiff infection in setting of aggressive abx use  -on contact precautions  -fu CDiff PCR    #REGINALD  RESOLVED  -Cr normalized  -likely pre-renal 2/2 hypotension in setting of sepsis    Case discussed with attending Dr. Galvan. ID team will continue to follow. Please page with any questions.    Katie Rodarte MD  Infectious disease resident  Pager [phone] or TEAMS 26y F with no pertinent past medical history, presented for 3 days of BL buttocks, genital, and LE erythema, edema, and pain, associated with numbness in the affected region, fevers, chills, fatigue, and orthostatic dizziness. Found in the ED to have tachycardia, fevers, hypotension, leukocytosis, meeting criteria for sepsis. CT a/p showing diffuse subcutaneous edema without any fluid collection amenable to drainage. Failed outpatient treatment with Bactrim    #SIRS after Brazilian butt lift  -s/p cefazolin, vancomycin, meropenem  -c/w cefepime   -MRSA swab negative  -GFR per Cystatin C is 58 (adequate)  -BCx NGTD  -UCx NGTD (normal jade)  -US of LEs unremarkable (DVT ruled-out)  -UA unremarkable  -PRN Tylenol for fevers  -follow CBC daily    #diarrhea  -concerning for possible CDiff infection in setting of aggressive abx use  -on contact precautions  -fu CDiff PCR    #REGINALD  RESOLVED  -Cr normalized  -likely pre-renal 2/2 hypotension in setting of sepsis    Case discussed with attending Dr. Galvan. ID team will continue to follow. Please page with any questions.    Katie Rodarte MD  Infectious disease resident  Pager [phone] or TEAMS 26y F with no pertinent past medical history, presented for 3 days of BL buttocks, genital, and LE erythema, edema, and pain, associated with numbness in the affected region, fevers, chills, fatigue, and orthostatic dizziness. Found in the ED to have tachycardia, fevers, hypotension, leukocytosis, meeting criteria for sepsis. CT a/p showing diffuse subcutaneous edema without any fluid collection amenable to drainage. Failed outpatient treatment with Bactrim    #SIRS after Brazilian butt lift  -s/p cefazolin, vancomycin, meropenem  -c/w cefazolin  -MRSA swab negative  -GFR per Cystatin C is 58 (adequate)  -BCx NGTD  -UCx NGTD (normal jade)  -US of LEs unremarkable (DVT ruled-out)  -UA unremarkable  -PRN Tylenol for fevers  -follow CBC daily    #diarrhea  -concerning for possible CDiff infection in setting of aggressive abx use  -on contact precautions  -fu CDiff PCR    #REGINALD  RESOLVED  -Cr normalized  -likely pre-renal 2/2 hypotension in setting of sepsis    Case discussed with attending Dr. Galvan. ID team will continue to follow. Please page with any questions.    Katie Rodarte MD  Infectious disease resident  Pager [phone] or TEAMS

## 2022-01-12 NOTE — DISCHARGE NOTE NURSING/CASE MANAGEMENT/SOCIAL WORK - PATIENT PORTAL LINK FT
You can access the FollowMyHealth Patient Portal offered by [facility] by registering at the following website: [email or web address]. By joining [employer]’s FollowMyHealth portal, you will also be able to view your health information using other applications (apps) compatible with our system.

## 2022-01-12 NOTE — PROGRESS NOTE ADULT - PROBLEM SELECTOR PLAN 1
- 2/2 infection (cellulitis vs. abscess) 2/2 BBL on 1/2/2022 at [facility]  - L-side enlarged, erythematous and indurated to greater extent than R-side  - No abscess seen on CT, however cannot r/o cellulitis  - Pt given prophylactic bactrim by plastic surgeon  - Afebrile on admission, but reported F/C prior to admission  - Tachy to 140s on admission  - WBC 19.16 on admission --> continues to rise, with dip 1/11 am, possibly 2/2 dilution  - Lactate 1.6 --> 2.0  - UA negative  - BCx NGTD, UCx neg  - s/f cellulitis  - Tylenol PRN for fever  - Patient febrile O/N 1/10 to 100.5, given jarad x1  - ID consulted, appreciate recs  - Cefepime narrowed to Ancef 1/11 - plan to d/c on PO abx if possible  - WBC improving today  - Spoke to Dr. Wadsworth, stated status 2/2 fat necrosis, would take 3-6 mo to heal

## 2022-01-12 NOTE — DISCHARGE NOTE NURSING/CASE MANAGEMENT/SOCIAL WORK - NSDCPEFALRISK_GEN_ALL_CORE
For information on Fall & Injury Prevention, visit: [email or web address] OR  [email or web address] OR  https://www.cdc.gov/steadi/patient.html

## 2022-01-12 NOTE — PROGRESS NOTE ADULT - PROBLEM SELECTOR PLAN 4
Improved  - Patient found to be hyponatremic with Na 129 on admission  - Likely in acute setting  - s/p 6L fluids  - trend BMP

## 2022-01-12 NOTE — PROGRESS NOTE ADULT - PROBLEM SELECTOR PLAN 2
- Cr 2.26 on admission (BL ~0.7)  - likely 2/2 sepsis  - s/p 2L LR, 1L NS in ED  - Avoid nephrotoxic agents  - s/p maintenance LR  - s/p 1L LR  - initial worsening of Cr, now improving  - trend BMP  - cystatin C, S 1.31  - eGFR by cystatin C 58

## 2022-01-12 NOTE — PROGRESS NOTE ADULT - ATTENDING COMMENTS
Patient seen and examined. Case discussed with the medical team on rounds. I agree with the findings and the plan above.    Patient is a 25 year old lady with severe sepsis 2/2 Jamaican butt lift. ID consulted. No abscess on imaging.   Team discussed with patient's plastic surgeon   As per ID -At this time can transition to PO keflex for 10 days.  REGINALD, likely ATN from severe sepsis and some prerenal, improving, now resolved.    Optimized for discharge, can follow up as outpatient with PCP and plastic surgeon.   Continue the rest of the work up and management as stated above.

## 2022-01-12 NOTE — PROGRESS NOTE ADULT - SUBJECTIVE AND OBJECTIVE BOX
Internal Medicine   Judy Markyguidomarianna | PGY-1    OVERNIGHT EVENTS: Patient transitioned to Ancef from cefepime.      SUBJECTIVE: Patient was seen and examined at bedside this morning. Feels pain/swelling in buttocks, abdomen has somewhat improved, but not by much. She is unable to ambulate for a prolonged time d/t pain. Reports F/C. States she has not had diarrhea/a BM since yesterday. Denies nausea/vomiting, headache, dizziness, shortness of breath, or chest pain/palpitations. Patient responding appropriately to questions and able to make needs known.       MEDICATIONS  (STANDING):  ceFAZolin   IVPB 1000 milliGRAM(s) IV Intermittent every 8 hours  heparin   Injectable 5000 Unit(s) SubCutaneous every 8 hours  influenza   Vaccine 0.5 milliLiter(s) IntraMuscular once  lactated ringers. 1000 milliLiter(s) (75 mL/Hr) IV Continuous <Continuous>  lactated ringers. 1000 milliLiter(s) (75 mL/Hr) IV Continuous <Continuous>  ondansetron    Tablet 4 milliGRAM(s) Oral once    MEDICATIONS  (PRN):  acetaminophen     Tablet .. 650 milliGRAM(s) Oral every 6 hours PRN Temp greater or equal to 38C (100.4F), Mild Pain (1 - 3)        T(F): 98.2 (01-12-22 @ 06:07), Max: 100.3 (01-11-22 @ 18:00)  HR: 108 (01-12-22 @ 06:07) (108 - 129)  BP: 102/68 (01-12-22 @ 06:07) (102/68 - 121/75)  BP(mean): --  RR: 16 (01-12-22 @ 06:07) (16 - 18)  SpO2: 100% (01-12-22 @ 06:07) (100% - 100%)    PHYSICAL EXAM:     GENERAL: NAD, lying in bed comfortably.  HEAD:  Atraumatic, normocephalic.  CHEST/LUNG: CTAB. No rales, rhonchi, wheezing, or rubs. Unlabored respirations.  HEART: RRR, no M/R/G, normal S1/S2.  ABDOMEN: Soft, diffusely tender, nondistended. Normoactive bowel sounds.  EXTREMITIES:  2+ peripheral pulses b/l, brisk capillary refill. 2+ PE in BLE.  NEURO:  AAOx3, no focal deficits.   SKIN: Large area of erythema, induration b/l on buttocks, grossly unchanged from previously.  PSYCH: Normal mood, affect.      LABS:                        8.6    17.46 )-----------( 378      ( 12 Jan 2022 06:32 )             25.3     01-12    141  |  105  |  13  ----------------------------<  79  3.9   |  23  |  0.87    Ca    8.5      12 Jan 2022 06:32  Phos  4.9     01-12  Mg     1.30     01-12    TPro  5.9<L>  /  Alb  2.6<L>  /  TBili  0.4  /  DBili  x   /  AST  18  /  ALT  18  /  AlkPhos  66  01-12            Creatinine Trend: 0.87<--, 1.37<--, 2.38<--, 2.64<--, 2.26<--, 0.67<--  I&O's Summary    BNP    RADIOLOGY & ADDITIONAL STUDIES:

## 2022-01-12 NOTE — PROGRESS NOTE ADULT - ASSESSMENT
25 yo F with PMH herniated discs x3 and aneurysmal renal artery (unspecified laterality) presenting with sepsis 2/2 BBL, as well as REGINALD, also found to be hyponatremic, now with improved renal function.

## 2022-01-12 NOTE — PROGRESS NOTE ADULT - ATTENDING COMMENTS
25 yo F with PMH herniated disc, renal artery enlargement (unclear laterality) presenting with pain and erythema in b/l buttocks and swelling in buttocks, genitals and LE following Brazilian butt lift on 1/2/2022  Fever, leukocytosis  Recent cosmetic procedure, now with redness and pain in buttocks (L > R)  On eval, vague patchy redness at site  Suspected inflammatory reaction to procedure > infectious cause  On abx for possible SSTI  Overall,  1) Skin soft tissue infection  - Cefazolin 1g q 8, when team DC planning, send out on PO Keflex 500mg q 6 to complete 10 day course  - Monitor site clinically  - F/U with plastic surgeon  2) Diarrhea  - Generally improvement  - F/U pending C diff  3) Fever, Leukocytosis  - Trend to normal  - F/U pending BCXs    Darwin Joe MD  Pager [phone]  From 5pm-9am, and on weekends call [phone]    I was physically present for the key portions of the evaluation and management service provided. I saw and examined the patient. I agree with the above history, physical, and plan except for any discrepancies which I have documented in “Attending Statements.” Please refer to “Attending Statements” for final plan.

## 2022-01-12 NOTE — PROGRESS NOTE ADULT - SUBJECTIVE AND OBJECTIVE BOX
Katie Rodarte MD   PGY-1, Internal Medicine  Pager: [phone] (NS) / [phone] (DEANNAJ)      Follow Up:  cellulitis 2/2 BBL    Interval History/ROS:  Leukocytosis downtrending, at 17 this AM    Went to assess patient at bedside. She reported feeling [ ]    Allergies  glyceryl thioglycolate (Rash)  No Known Drug Allergies  Shrimp (Hives)        ANTIMICROBIALS:  ceFAZolin   IVPB 1000 every 8 hours      OTHER MEDS:  MEDICATIONS  (STANDING):  acetaminophen     Tablet .. 650 every 6 hours PRN  heparin   Injectable 5000 every 8 hours  influenza   Vaccine 0.5 once  ondansetron    Tablet 4 once      Vital Signs Last 24 Hrs  T(C): 36.8 (12 Jan 2022 06:07), Max: 37.9 (11 Jan 2022 18:00)  T(F): 98.2 (12 Jan 2022 06:07), Max: 100.3 (11 Jan 2022 18:00)  HR: 108 (12 Jan 2022 06:07) (108 - 129)  BP: 102/68 (12 Jan 2022 06:07) (102/68 - 116/67)  BP(mean): --  RR: 16 (12 Jan 2022 06:07) (16 - 18)  SpO2: 100% (12 Jan 2022 06:07) (100% - 100%)    PHYSICAL EXAM:  Constitutional: non-toxic, no distress  HEAD/EYES: anicteric, no conjunctival injection  ENT:  supple, no thrush  Cardiovascular:   tachycardia; normal S1, S2, no murmur, no edema  Respiratory:  clear BS bilaterally, no wheezes, no rales  GI:  soft, non-tender, normal bowel sounds  :  no heath, no CVA tenderness  Musculoskeletal:  no synovitis, normal ROM  Neurologic: awake and alert, normal strength, no focal findings  Skin:  +indurated erythema of BL buttocks, thighs, and groin with associated TTP  Heme/Onc: no lymphadenopathy   Psychiatric:  awake, alert, appropriate mood  Abdomen: soft, non-distended, BS wnl, diffuse TTP                                8.6    17.46 )-----------( 378      ( 12 Jan 2022 06:32 )             25.3       01-12    141  |  105  |  13  ----------------------------<  79  3.9   |  23  |  0.87    Ca    8.5      12 Jan 2022 06:32  Phos  4.9     01-12  Mg     1.30     01-12    TPro  5.9<L>  /  Alb  2.6<L>  /  TBili  0.4  /  DBili  x   /  AST  18  /  ALT  18  /  AlkPhos  66  01-12          MICROBIOLOGY:  v    Culture - Blood (collected 09 Jan 2022 14:03)  Source: .Blood Blood-Venous  Preliminary Report (10 Sundar 2022 15:02):    No growth to date.    Culture - Blood (collected 09 Jan 2022 14:03)  Source: .Blood Blood-Peripheral  Preliminary Report (10 Sundar 2022 15:02):    No growth to date.    Culture - Urine (collected 09 Jan 2022 10:52)  Source: Clean Catch Clean Catch (Midstream)  Final Report (10 Sundar 2022 09:14):    <10,000 CFU/mL Normal Urogenital Safia                    RADIOLOGY:  no new imaging Katie Rodarte MD   PGY-1, Internal Medicine  Pager: [phone] (NS) / [phone] ([facility])      Follow Up:  cellulitis 2/2 BBL    Interval History/ROS:  Leukocytosis downtrending, at 17 this AM    Went to assess patient at bedside. She reported that BM are non-bloody, loose 2x in last 24h. No recent nausea. Still having pain, worst in buttocks, improves with Tylenol and ice packs. Denies fevers or chills.    Allergies  glyceryl thioglycolate (Rash)  No Known Drug Allergies  Shrimp (Hives)        ANTIMICROBIALS:  ceFAZolin   IVPB 1000 every 8 hours      OTHER MEDS:  MEDICATIONS  (STANDING):  acetaminophen     Tablet .. 650 every 6 hours PRN  heparin   Injectable 5000 every 8 hours  influenza   Vaccine 0.5 once  ondansetron    Tablet 4 once      Vital Signs Last 24 Hrs  T(C): 36.8 (12 Jan 2022 06:07), Max: 37.9 (11 Jan 2022 18:00)  T(F): 98.2 (12 Jan 2022 06:07), Max: 100.3 (11 Jan 2022 18:00)  HR: 108 (12 Jan 2022 06:07) (108 - 129)  BP: 102/68 (12 Jan 2022 06:07) (102/68 - 116/67)  BP(mean): --  RR: 16 (12 Jan 2022 06:07) (16 - 18)  SpO2: 100% (12 Jan 2022 06:07) (100% - 100%)    PHYSICAL EXAM:  Constitutional: non-toxic, no distress  HEAD/EYES: anicteric, no conjunctival injection  ENT:  supple, no thrush  Cardiovascular:   tachycardia; normal S1, S2, no murmur, no edema  Respiratory:  clear BS bilaterally, no wheezes, no rales  GI:  soft, non-tender, normal bowel sounds  :  no heath, no CVA tenderness  Musculoskeletal:  no synovitis, normal ROM  Neurologic: awake and alert, normal strength, no focal findings  Skin:  +indurated erythema of BL buttocks, thighs, and groin with associated TTP  Heme/Onc: no lymphadenopathy   Psychiatric:  awake, alert, appropriate mood  Abdomen: soft, non-distended, BS wnl, diffuse TTP                                8.6    17.46 )-----------( 378      ( 12 Jan 2022 06:32 )             25.3       01-12    141  |  105  |  13  ----------------------------<  79  3.9   |  23  |  0.87    Ca    8.5      12 Jan 2022 06:32  Phos  4.9     01-12  Mg     1.30     01-12    TPro  5.9<L>  /  Alb  2.6<L>  /  TBili  0.4  /  DBili  x   /  AST  18  /  ALT  18  /  AlkPhos  66  01-12          MICROBIOLOGY:  v    Culture - Blood (collected 09 Jan 2022 14:03)  Source: .Blood Blood-Venous  Preliminary Report (10 Sundar 2022 15:02):    No growth to date.    Culture - Blood (collected 09 Jan 2022 14:03)  Source: .Blood Blood-Peripheral  Preliminary Report (10 Sundar 2022 15:02):    No growth to date.    Culture - Urine (collected 09 Jan 2022 10:52)  Source: Clean Catch Clean Catch (Midstream)  Final Report (10 Sundar 2022 09:14):    <10,000 CFU/mL Normal Urogenital Safia                    RADIOLOGY:  no new imaging

## 2022-01-13 ENCOUNTER — EMERGENCY (EMERGENCY)
Facility: HOSPITAL | Age: 27
LOS: 1 days | Discharge: HOME | End: 2022-01-13
Attending: STUDENT IN AN ORGANIZED HEALTH CARE EDUCATION/TRAINING PROGRAM | Admitting: STUDENT IN AN ORGANIZED HEALTH CARE EDUCATION/TRAINING PROGRAM
Payer: MEDICAID

## 2022-01-13 VITALS
HEIGHT: 61 IN | SYSTOLIC BLOOD PRESSURE: 132 MMHG | OXYGEN SATURATION: 100 % | TEMPERATURE: 99 F | DIASTOLIC BLOOD PRESSURE: 83 MMHG | RESPIRATION RATE: 16 BRPM | HEART RATE: 115 BPM

## 2022-01-13 LAB
ALBUMIN SERPL ELPH-MCNC: 3.2 G/DL — LOW (ref 3.3–5)
ALP SERPL-CCNC: 81 U/L — SIGNIFICANT CHANGE UP (ref 40–120)
ALT FLD-CCNC: 109 U/L — HIGH (ref 4–33)
ANION GAP SERPL CALC-SCNC: 11 MMOL/L — SIGNIFICANT CHANGE UP (ref 7–14)
APTT BLD: 29.5 SEC — SIGNIFICANT CHANGE UP (ref 27–36.3)
AST SERPL-CCNC: 132 U/L — HIGH (ref 4–32)
BASE EXCESS BLDV CALC-SCNC: 3.2 MMOL/L — HIGH (ref -2–3)
BASOPHILS # BLD AUTO: 0.04 K/UL — SIGNIFICANT CHANGE UP (ref 0–0.2)
BASOPHILS NFR BLD AUTO: 0.4 % — SIGNIFICANT CHANGE UP (ref 0–2)
BILIRUB SERPL-MCNC: 0.4 MG/DL — SIGNIFICANT CHANGE UP (ref 0.2–1.2)
BLOOD GAS VENOUS COMPREHENSIVE RESULT: SIGNIFICANT CHANGE UP
BUN SERPL-MCNC: 10 MG/DL — SIGNIFICANT CHANGE UP (ref 7–23)
CALCIUM SERPL-MCNC: 9.1 MG/DL — SIGNIFICANT CHANGE UP (ref 8.4–10.5)
CHLORIDE BLDV-SCNC: 104 MMOL/L — SIGNIFICANT CHANGE UP (ref 96–108)
CHLORIDE SERPL-SCNC: 102 MMOL/L — SIGNIFICANT CHANGE UP (ref 98–107)
CO2 BLDV-SCNC: 29.9 MMOL/L — HIGH (ref 22–26)
CO2 SERPL-SCNC: 25 MMOL/L — SIGNIFICANT CHANGE UP (ref 22–31)
CREAT SERPL-MCNC: 0.68 MG/DL — SIGNIFICANT CHANGE UP (ref 0.5–1.3)
EOSINOPHIL # BLD AUTO: 0.29 K/UL — SIGNIFICANT CHANGE UP (ref 0–0.5)
EOSINOPHIL NFR BLD AUTO: 2.6 % — SIGNIFICANT CHANGE UP (ref 0–6)
FLUAV AG NPH QL: SIGNIFICANT CHANGE UP
FLUBV AG NPH QL: SIGNIFICANT CHANGE UP
GAS PNL BLDV: 137 MMOL/L — SIGNIFICANT CHANGE UP (ref 136–145)
GLUCOSE BLDV-MCNC: 75 MG/DL — SIGNIFICANT CHANGE UP (ref 70–99)
GLUCOSE SERPL-MCNC: 80 MG/DL — SIGNIFICANT CHANGE UP (ref 70–99)
HCO3 BLDV-SCNC: 28 MMOL/L — SIGNIFICANT CHANGE UP (ref 22–29)
HCT VFR BLD CALC: 29.2 % — LOW (ref 34.5–45)
HCT VFR BLDA CALC: 30 % — LOW (ref 34.5–46.5)
HGB BLD CALC-MCNC: 10 G/DL — LOW (ref 11.5–15.5)
HGB BLD-MCNC: 9.5 G/DL — LOW (ref 11.5–15.5)
IANC: 8.26 K/UL — SIGNIFICANT CHANGE UP (ref 1.5–8.5)
IMM GRANULOCYTES NFR BLD AUTO: 1.1 % — SIGNIFICANT CHANGE UP (ref 0–1.5)
INR BLD: 1.27 RATIO — HIGH (ref 0.88–1.16)
LACTATE BLDV-MCNC: 1.4 MMOL/L — SIGNIFICANT CHANGE UP (ref 0.5–2)
LYMPHOCYTES # BLD AUTO: 1.83 K/UL — SIGNIFICANT CHANGE UP (ref 1–3.3)
LYMPHOCYTES # BLD AUTO: 16.2 % — SIGNIFICANT CHANGE UP (ref 13–44)
MCHC RBC-ENTMCNC: 30.7 PG — SIGNIFICANT CHANGE UP (ref 27–34)
MCHC RBC-ENTMCNC: 32.5 GM/DL — SIGNIFICANT CHANGE UP (ref 32–36)
MCV RBC AUTO: 94.5 FL — SIGNIFICANT CHANGE UP (ref 80–100)
MONOCYTES # BLD AUTO: 0.75 K/UL — SIGNIFICANT CHANGE UP (ref 0–0.9)
MONOCYTES NFR BLD AUTO: 6.6 % — SIGNIFICANT CHANGE UP (ref 2–14)
NEUTROPHILS # BLD AUTO: 8.26 K/UL — HIGH (ref 1.8–7.4)
NEUTROPHILS NFR BLD AUTO: 73.1 % — SIGNIFICANT CHANGE UP (ref 43–77)
NRBC # BLD: 0 /100 WBCS — SIGNIFICANT CHANGE UP
NRBC # FLD: 0 K/UL — SIGNIFICANT CHANGE UP
PCO2 BLDV: 46 MMHG — HIGH (ref 39–42)
PH BLDV: 7.4 — SIGNIFICANT CHANGE UP (ref 7.32–7.43)
PLATELET # BLD AUTO: 481 K/UL — HIGH (ref 150–400)
PO2 BLDV: 27 MMHG — SIGNIFICANT CHANGE UP
POTASSIUM BLDV-SCNC: 5 MMOL/L — SIGNIFICANT CHANGE UP (ref 3.5–5.1)
POTASSIUM SERPL-MCNC: 3.9 MMOL/L — SIGNIFICANT CHANGE UP (ref 3.5–5.3)
POTASSIUM SERPL-SCNC: 3.9 MMOL/L — SIGNIFICANT CHANGE UP (ref 3.5–5.3)
PROT SERPL-MCNC: 7.7 G/DL — SIGNIFICANT CHANGE UP (ref 6–8.3)
PROTHROM AB SERPL-ACNC: 14.4 SEC — HIGH (ref 10.6–13.6)
RBC # BLD: 3.09 M/UL — LOW (ref 3.8–5.2)
RBC # FLD: 12.8 % — SIGNIFICANT CHANGE UP (ref 10.3–14.5)
RSV RNA NPH QL NAA+NON-PROBE: SIGNIFICANT CHANGE UP
SAO2 % BLDV: 37 % — SIGNIFICANT CHANGE UP
SARS-COV-2 RNA SPEC QL NAA+PROBE: SIGNIFICANT CHANGE UP
SODIUM SERPL-SCNC: 138 MMOL/L — SIGNIFICANT CHANGE UP (ref 135–145)
TROPONIN T, HIGH SENSITIVITY RESULT: <6 NG/L — SIGNIFICANT CHANGE UP
WBC # BLD: 11.29 K/UL — HIGH (ref 3.8–10.5)
WBC # FLD AUTO: 11.29 K/UL — HIGH (ref 3.8–10.5)

## 2022-01-13 PROCEDURE — 71275 CT ANGIOGRAPHY CHEST: CPT | Mod: 26,ME

## 2022-01-13 PROCEDURE — 74177 CT ABD & PELVIS W/CONTRAST: CPT | Mod: 26,MG

## 2022-01-13 PROCEDURE — 71045 X-RAY EXAM CHEST 1 VIEW: CPT | Mod: 26

## 2022-01-13 PROCEDURE — G1004: CPT

## 2022-01-13 PROCEDURE — 99285 EMERGENCY DEPT VISIT HI MDM: CPT

## 2022-01-13 RX ORDER — ONDANSETRON 8 MG/1
4 TABLET, FILM COATED ORAL ONCE
Refills: 0 | Status: COMPLETED | OUTPATIENT
Start: 2022-01-13 | End: 2022-01-13

## 2022-01-13 RX ORDER — ACETAMINOPHEN 500 MG
1000 TABLET ORAL ONCE
Refills: 0 | Status: COMPLETED | OUTPATIENT
Start: 2022-01-13 | End: 2022-01-13

## 2022-01-13 RX ORDER — CEPHALEXIN 500 MG
1 CAPSULE ORAL
Qty: 24 | Refills: 0
Start: 2022-01-13 | End: 2022-01-18

## 2022-01-13 RX ORDER — SODIUM CHLORIDE 9 MG/ML
1000 INJECTION INTRAMUSCULAR; INTRAVENOUS; SUBCUTANEOUS ONCE
Refills: 0 | Status: COMPLETED | OUTPATIENT
Start: 2022-01-13 | End: 2022-01-13

## 2022-01-13 RX ORDER — CEPHALEXIN 500 MG
1 CAPSULE ORAL
Qty: 10 | Refills: 0
Start: 2022-01-13 | End: 2022-01-22

## 2022-01-13 RX ADMIN — SODIUM CHLORIDE 1000 MILLILITER(S): 9 INJECTION INTRAMUSCULAR; INTRAVENOUS; SUBCUTANEOUS at 20:48

## 2022-01-13 RX ADMIN — Medication 400 MILLIGRAM(S): at 20:48

## 2022-01-13 RX ADMIN — ONDANSETRON 4 MILLIGRAM(S): 8 TABLET, FILM COATED ORAL at 20:48

## 2022-01-13 NOTE — ED ADULT NURSE NOTE - OBJECTIVE STATEMENT
Pt received in INT10C. C/o worsening headache and dizziness. States unable to focus and increasingly weak. Pt was here for similar symptoms a few days ago s/p Brazilian butt lift, d/c yesterday around 6PM with diagnosis of cellulitis and told to come back  to hospital if symptoms worsen. A&Ox4, ambulatory. Breathing even and unlabored. Pt also endorses nausea, no vomiting. NAD. Pt currently at XR. Pending labs and medications. Will reassess.

## 2022-01-13 NOTE — ED PROVIDER NOTE - PHYSICAL EXAMINATION
Vital signs reviewed.   CONSTITUTIONAL: Well-appearing; well-nourished; in no apparent distress. Non-toxic appearing.   HEAD: Normocephalic, atraumatic.  EYES: PERRL, EOM intact, conjunctiva and sclera WNL.  CARD: Normal S1, S2; no murmurs, rubs, or gallops noted.  RESP: Normal chest excursion with respiration; breath sounds clear and equal bilaterally; no wheezes, rhonchi, or rales.  ABD/GI: soft, non-distended; + diffuse abd tenderness.   EXT/MS: moves all extremities; no pedal edema.  SKIN: +large area of erythema to Rt lateral aspect of buttock region/thigh area warm to touch, does not go beyond marked area. Lt lateral aspect of buttock small area of erythema and warm, appears improved. +b/l TTP over area. +ecchymosis noted to lower abdomen surrounding surgical incision which appears C/D/I and healed. + small area of ecchymosis noted to LUQ.   NEURO: Awake, alert, oriented x 3, no gross deficits  PSYCH: Normal mood; appropriate affect.

## 2022-01-13 NOTE — ED PROVIDER NOTE - PROGRESS NOTE DETAILS
PA Maldonado- Patient post void is 684 ml. Heath placed with good output. UA normal . Pt to be dc'd with heath and urology follow up. Pt already on keflex q6. Pt asking for new rx to be sent to different pharmacy. Will send scripts. Pt given dc papers. All questions and concerns addressed. Strict return instructions given. No complaints noted.

## 2022-01-13 NOTE — ED PROVIDER NOTE - OBJECTIVE STATEMENT
HPI- Patient is a 26 y.o female with PMHx of recent brazilin butt lift done 01/2 with Dr. Wadsworth in [facility] complicated by cellulitis, recently admitted to [facility] for IV abx and ID and was discharged 01/12 now returning to ED c/o dizziness, chest pain, sob and continued abd pain/buttock pain. Pt states that starting today she noted Lt side chest pain that is sharp in nature and states she will then feel dizziness a.w palpitations. Also notes some sob, feels like she cant take a deep breath. Admits to overall bruising and soreness to abdomen and b/l buttock region. Pts area of cellulitis are demarcated, states redness has not worsened but continued pain. States she has been taking tylenol with little relief. Admits to continuous nausea and decreased appetite. Pt denies fevers/chills, URI sx, diarrhea, vomiting, dysuria, hematuria, hx of PE/DVT, OCP use, LE pain/swelling or any other complaints.

## 2022-01-13 NOTE — ED ADULT NURSE NOTE - CHIEF COMPLAINT QUOTE
c/o headache and dizziness, recent DX with cellulitis from Brazilian butt lift on 1/2, DC from [facility] yesterday.  Advised to come back if symptoms worsened.

## 2022-01-13 NOTE — ED ADULT TRIAGE NOTE - CHIEF COMPLAINT QUOTE
c/o headache and dizziness, recent DX with cellulitis from Brazilian butt lift on 1/2, DC from [facility] yesterday.  Advised to come back if symptoms worsened.

## 2022-01-13 NOTE — ED PROVIDER NOTE - PATIENT PORTAL LINK FT
You can access the FollowMyHealth Patient Portal offered by [facility] by registering at the following website: [email or web address]. By joining [employer]’s FollowMyHealth portal, you will also be able to view your health information using other applications (apps) compatible with our system.

## 2022-01-13 NOTE — ED PROVIDER NOTE - CLINICAL SUMMARY MEDICAL DECISION MAKING FREE TEXT BOX
Patient is a 26 y.o female with PMHx of recent brazilin butt lift done 01/2 with Dr. Wadsworth in [facility] complicated by cellulitis, recently admitted to [facility] for IV abx and ID and was discharged 01/12 now returning to ED c/o dizziness, chest pain, sob and continued abd pain/buttock pain.    DDx- post op cellulitis, possible concern for PE s/p surgery and admission c/o CP/sob. Plan for labs, trop, ecg, cxr, cta chest r/o PE. Will also obtain ct a/p to r/o  possible abscess collection. Will monitor and reassess Patient is a 26 y.o female with PMHx of recent brazilin butt lift done 01/2 with Dr. Wadsworth in [facility] complicated by cellulitis, recently admitted to [facility] for IV abx and ID and was discharged 01/12 now returning to ED c/o dizziness, chest pain, sob and continued abd pain/buttock pain.    DDx- post op cellulitis, possible concern for PE s/p surgery and admission c/o CP/sob. Plan for labs, trop, ecg, cxr, cta chest r/o PE. Will also obtain ct a/p to r/o  possible abscess collection. Will monitor and reassess    Attending MD Cardenas: Agree with above.

## 2022-01-13 NOTE — ED PROVIDER NOTE - NSFOLLOWUPINSTRUCTIONS_ED_ALL_ED_FT
Patient advised to follow up with UROLOGY IN 48-72 hours for heath removal or can return to ED, also advised to follow up with her PLASTIC SURGEON WITHIN WEEK  and told to return to the emergency department immediately for any new or concerning symptoms OR ANY OTHER COMPLAINTS. Patient agrees with plan.        - Take antibiotic as directed   -Take tylenol 650 every 4-6 hours as needed for pain   -Rest, stay hydrated   urology clinic (call [phone] to make an appointment)      Advance activity as tolerated.  Continue all previously prescribed medications as directed unless otherwise instructed.  Follow up with your primary care physician in 48-72 hours- bring copies of your results.  Return to the ER for worsening or persistent symptoms, and/or ANY NEW OR CONCERNING SYMPTOMS. If you have issues obtaining follow up, please call: [phone] to obtain a doctor or specialist who takes your insurance in your area.  You may call [phone] to make an appointment with the internal medicine clinic.

## 2022-01-14 VITALS
RESPIRATION RATE: 18 BRPM | OXYGEN SATURATION: 100 % | DIASTOLIC BLOOD PRESSURE: 69 MMHG | HEART RATE: 100 BPM | SYSTOLIC BLOOD PRESSURE: 115 MMHG | TEMPERATURE: 98 F

## 2022-01-14 PROBLEM — Z87.39 PERSONAL HISTORY OF OTHER DISEASES OF THE MUSCULOSKELETAL SYSTEM AND CONNECTIVE TISSUE: Chronic | Status: ACTIVE | Noted: 2022-01-09

## 2022-01-14 PROBLEM — I72.2 ANEURYSM OF RENAL ARTERY: Chronic | Status: ACTIVE | Noted: 2022-01-09

## 2022-01-14 LAB
APPEARANCE UR: CLEAR — SIGNIFICANT CHANGE UP
BILIRUB UR-MCNC: NEGATIVE — SIGNIFICANT CHANGE UP
COLOR SPEC: SIGNIFICANT CHANGE UP
CULTURE RESULTS: SIGNIFICANT CHANGE UP
CULTURE RESULTS: SIGNIFICANT CHANGE UP
DIFF PNL FLD: NEGATIVE — SIGNIFICANT CHANGE UP
GLUCOSE UR QL: NEGATIVE — SIGNIFICANT CHANGE UP
KETONES UR-MCNC: NEGATIVE — SIGNIFICANT CHANGE UP
LEUKOCYTE ESTERASE UR-ACNC: NEGATIVE — SIGNIFICANT CHANGE UP
NITRITE UR-MCNC: NEGATIVE — SIGNIFICANT CHANGE UP
PH UR: 6.5 — SIGNIFICANT CHANGE UP (ref 5–8)
PROT UR-MCNC: NEGATIVE — SIGNIFICANT CHANGE UP
SP GR SPEC: 1.02 — SIGNIFICANT CHANGE UP (ref 1–1.05)
SPECIMEN SOURCE: SIGNIFICANT CHANGE UP
SPECIMEN SOURCE: SIGNIFICANT CHANGE UP
UROBILINOGEN FLD QL: SIGNIFICANT CHANGE UP

## 2022-01-14 RX ORDER — CEPHALEXIN 500 MG
1 CAPSULE ORAL
Qty: 40 | Refills: 0
Start: 2022-01-14 | End: 2022-01-23

## 2022-01-14 RX ORDER — ACETAMINOPHEN 500 MG
650 TABLET ORAL ONCE
Refills: 0 | Status: COMPLETED | OUTPATIENT
Start: 2022-01-14 | End: 2022-01-14

## 2022-01-14 RX ORDER — ONDANSETRON 8 MG/1
1 TABLET, FILM COATED ORAL
Qty: 15 | Refills: 0
Start: 2022-01-14 | End: 2022-01-18

## 2022-01-14 RX ORDER — ACETAMINOPHEN 500 MG
2 TABLET ORAL
Qty: 84 | Refills: 0
Start: 2022-01-14 | End: 2022-01-20

## 2022-01-14 RX ADMIN — Medication 650 MILLIGRAM(S): at 03:52

## 2022-01-14 NOTE — ED ADULT NURSE REASSESSMENT NOTE - NS ED NURSE REASSESS COMMENT FT1
14FR Jeong placed, with 1800mL urine output. Pt tolerated procedure well, minimal discomfort, medicated per MAR. Jeong connected to leg bag, educated pt on how to drain bag.

## 2022-01-15 LAB
CULTURE RESULTS: NO GROWTH — SIGNIFICANT CHANGE UP
SPECIMEN SOURCE: SIGNIFICANT CHANGE UP

## 2022-01-16 ENCOUNTER — EMERGENCY (EMERGENCY)
Facility: HOSPITAL | Age: 27
LOS: 1 days | Discharge: HOME | End: 2022-01-16
Attending: EMERGENCY MEDICINE | Admitting: EMERGENCY MEDICINE
Payer: MEDICAID

## 2022-01-16 VITALS
OXYGEN SATURATION: 99 % | SYSTOLIC BLOOD PRESSURE: 130 MMHG | DIASTOLIC BLOOD PRESSURE: 78 MMHG | TEMPERATURE: 98 F | HEART RATE: 110 BPM | RESPIRATION RATE: 18 BRPM | HEIGHT: 61 IN

## 2022-01-16 PROCEDURE — 99283 EMERGENCY DEPT VISIT LOW MDM: CPT

## 2022-01-16 NOTE — ED ADULT NURSE REASSESSMENT NOTE - NS ED NURSE REASSESS COMMENT FT1
pt received in rm 18 AAo x 3. pt s/p Brazilian butt lift. pt reports cellulitis to b/l gluteal. pt states she wants to get heath removed. pt denies sob, chest pain, n/v/d, fevers, chills. respirations even and unlabored. heath removed.

## 2022-01-16 NOTE — ED PROVIDER NOTE - NSFOLLOWUPINSTRUCTIONS_ED_ALL_ED_FT
Follow w/ Urology for evaluation of urinary retention (which may be a result of the recent surgery). Call [phone] and ask for an appointment at a convenient location.     Return if significant lower abd pain, which may be due to urine accumulation in the bladder (urinary retention).

## 2022-01-16 NOTE — ED PROVIDER NOTE - PATIENT PORTAL LINK FT
You can access the FollowMyHealth Patient Portal offered by [facility] by registering at the following website: [email or web address]. By joining [employer]’s FollowMyHealth portal, you will also be able to view your health information using other applications (apps) compatible with our system.

## 2022-01-16 NOTE — ED PROVIDER NOTE - PHYSICAL EXAMINATION
Well appearing, well nourished, awake, alert, oriented to person, place, time/situation and in no apparent distress.    Airway patent    Eyes without scleral injection. No jaundice.    Strong pulse.    Respirations unlabored.    Abdomen soft, non-tender, no guarding.    Spine appears normal, range of motion is not limited, no muscle or joint tenderness.    Alert and oriented, no gross motor or sensory deficits.    Skin normal color for race, warm, dry and intact. No evidence of rash.    No SI/HI. Well appearing, well nourished, awake, alert, oriented to person, place, time/situation and in no apparent distress.    Airway patent    Eyes without scleral injection. No jaundice.    Strong pulse.    Respirations unlabored.    Abdomen soft, non-tender, no guarding.    Spine appears normal, range of motion is not limited, no muscle or joint tenderness.    Alert and oriented, no gross motor or sensory deficits.    Skin: minimal redness around wound site at apex where buttocks meet; no pus/tenderness/swelling. Is on Abx.    No SI/HI.

## 2022-01-19 ENCOUNTER — APPOINTMENT (OUTPATIENT)
Dept: UROLOGY | Facility: CLINIC | Age: 27
End: 2022-01-19

## 2022-01-23 ENCOUNTER — EMERGENCY (EMERGENCY)
Facility: HOSPITAL | Age: 27
LOS: 1 days | Discharge: HOME | End: 2022-01-23
Attending: EMERGENCY MEDICINE | Admitting: EMERGENCY MEDICINE
Payer: MEDICAID

## 2022-01-23 VITALS
DIASTOLIC BLOOD PRESSURE: 96 MMHG | TEMPERATURE: 100 F | RESPIRATION RATE: 18 BRPM | HEIGHT: 61 IN | SYSTOLIC BLOOD PRESSURE: 131 MMHG | OXYGEN SATURATION: 100 % | HEART RATE: 119 BPM

## 2022-01-23 LAB
ALBUMIN SERPL ELPH-MCNC: 4.9 G/DL — SIGNIFICANT CHANGE UP (ref 3.3–5)
ALP SERPL-CCNC: 72 U/L — SIGNIFICANT CHANGE UP (ref 40–120)
ALT FLD-CCNC: 28 U/L — SIGNIFICANT CHANGE UP (ref 4–33)
ANION GAP SERPL CALC-SCNC: 12 MMOL/L — SIGNIFICANT CHANGE UP (ref 7–14)
APPEARANCE UR: CLEAR — SIGNIFICANT CHANGE UP
APTT BLD: 30 SEC — SIGNIFICANT CHANGE UP (ref 27–36.3)
AST SERPL-CCNC: 20 U/L — SIGNIFICANT CHANGE UP (ref 4–32)
B PERT DNA SPEC QL NAA+PROBE: SIGNIFICANT CHANGE UP
B PERT+PARAPERT DNA PNL SPEC NAA+PROBE: SIGNIFICANT CHANGE UP
BASOPHILS # BLD AUTO: 0.04 K/UL — SIGNIFICANT CHANGE UP (ref 0–0.2)
BASOPHILS NFR BLD AUTO: 0.7 % — SIGNIFICANT CHANGE UP (ref 0–2)
BILIRUB SERPL-MCNC: 0.3 MG/DL — SIGNIFICANT CHANGE UP (ref 0.2–1.2)
BILIRUB UR-MCNC: NEGATIVE — SIGNIFICANT CHANGE UP
BLOOD GAS VENOUS COMPREHENSIVE RESULT: SIGNIFICANT CHANGE UP
BORDETELLA PARAPERTUSSIS (RAPRVP): SIGNIFICANT CHANGE UP
BUN SERPL-MCNC: 16 MG/DL — SIGNIFICANT CHANGE UP (ref 7–23)
C PNEUM DNA SPEC QL NAA+PROBE: SIGNIFICANT CHANGE UP
CALCIUM SERPL-MCNC: 9.8 MG/DL — SIGNIFICANT CHANGE UP (ref 8.4–10.5)
CHLORIDE SERPL-SCNC: 102 MMOL/L — SIGNIFICANT CHANGE UP (ref 98–107)
CO2 SERPL-SCNC: 26 MMOL/L — SIGNIFICANT CHANGE UP (ref 22–31)
COLOR SPEC: SIGNIFICANT CHANGE UP
CREAT SERPL-MCNC: 0.75 MG/DL — SIGNIFICANT CHANGE UP (ref 0.5–1.3)
DIFF PNL FLD: ABNORMAL
EOSINOPHIL # BLD AUTO: 0.03 K/UL — SIGNIFICANT CHANGE UP (ref 0–0.5)
EOSINOPHIL NFR BLD AUTO: 0.5 % — SIGNIFICANT CHANGE UP (ref 0–6)
FLUAV SUBTYP SPEC NAA+PROBE: SIGNIFICANT CHANGE UP
FLUBV RNA SPEC QL NAA+PROBE: SIGNIFICANT CHANGE UP
GLUCOSE SERPL-MCNC: 76 MG/DL — SIGNIFICANT CHANGE UP (ref 70–99)
GLUCOSE UR QL: NEGATIVE — SIGNIFICANT CHANGE UP
HADV DNA SPEC QL NAA+PROBE: SIGNIFICANT CHANGE UP
HCOV 229E RNA SPEC QL NAA+PROBE: SIGNIFICANT CHANGE UP
HCOV HKU1 RNA SPEC QL NAA+PROBE: SIGNIFICANT CHANGE UP
HCOV NL63 RNA SPEC QL NAA+PROBE: SIGNIFICANT CHANGE UP
HCOV OC43 RNA SPEC QL NAA+PROBE: SIGNIFICANT CHANGE UP
HCT VFR BLD CALC: 37.8 % — SIGNIFICANT CHANGE UP (ref 34.5–45)
HGB BLD-MCNC: 12.6 G/DL — SIGNIFICANT CHANGE UP (ref 11.5–15.5)
HMPV RNA SPEC QL NAA+PROBE: SIGNIFICANT CHANGE UP
HPIV1 RNA SPEC QL NAA+PROBE: SIGNIFICANT CHANGE UP
HPIV2 RNA SPEC QL NAA+PROBE: SIGNIFICANT CHANGE UP
HPIV3 RNA SPEC QL NAA+PROBE: SIGNIFICANT CHANGE UP
HPIV4 RNA SPEC QL NAA+PROBE: SIGNIFICANT CHANGE UP
IANC: 3.29 K/UL — SIGNIFICANT CHANGE UP (ref 1.5–8.5)
IMM GRANULOCYTES NFR BLD AUTO: 0.4 % — SIGNIFICANT CHANGE UP (ref 0–1.5)
INR BLD: 1.18 RATIO — HIGH (ref 0.88–1.16)
KETONES UR-MCNC: NEGATIVE — SIGNIFICANT CHANGE UP
LEUKOCYTE ESTERASE UR-ACNC: NEGATIVE — SIGNIFICANT CHANGE UP
LYMPHOCYTES # BLD AUTO: 1.5 K/UL — SIGNIFICANT CHANGE UP (ref 1–3.3)
LYMPHOCYTES # BLD AUTO: 27.4 % — SIGNIFICANT CHANGE UP (ref 13–44)
M PNEUMO DNA SPEC QL NAA+PROBE: SIGNIFICANT CHANGE UP
MCHC RBC-ENTMCNC: 31.2 PG — SIGNIFICANT CHANGE UP (ref 27–34)
MCHC RBC-ENTMCNC: 33.3 GM/DL — SIGNIFICANT CHANGE UP (ref 32–36)
MCV RBC AUTO: 93.6 FL — SIGNIFICANT CHANGE UP (ref 80–100)
MONOCYTES # BLD AUTO: 0.6 K/UL — SIGNIFICANT CHANGE UP (ref 0–0.9)
MONOCYTES NFR BLD AUTO: 10.9 % — SIGNIFICANT CHANGE UP (ref 2–14)
NEUTROPHILS # BLD AUTO: 3.29 K/UL — SIGNIFICANT CHANGE UP (ref 1.8–7.4)
NEUTROPHILS NFR BLD AUTO: 60.1 % — SIGNIFICANT CHANGE UP (ref 43–77)
NITRITE UR-MCNC: NEGATIVE — SIGNIFICANT CHANGE UP
NRBC # BLD: 0 /100 WBCS — SIGNIFICANT CHANGE UP
NRBC # FLD: 0 K/UL — SIGNIFICANT CHANGE UP
PH UR: 6 — SIGNIFICANT CHANGE UP (ref 5–8)
PLATELET # BLD AUTO: 515 K/UL — HIGH (ref 150–400)
POTASSIUM SERPL-MCNC: 3.9 MMOL/L — SIGNIFICANT CHANGE UP (ref 3.5–5.3)
POTASSIUM SERPL-SCNC: 3.9 MMOL/L — SIGNIFICANT CHANGE UP (ref 3.5–5.3)
PROT SERPL-MCNC: 9.1 G/DL — HIGH (ref 6–8.3)
PROT UR-MCNC: NEGATIVE — SIGNIFICANT CHANGE UP
PROTHROM AB SERPL-ACNC: 13.4 SEC — SIGNIFICANT CHANGE UP (ref 10.6–13.6)
RAPID RVP RESULT: SIGNIFICANT CHANGE UP
RBC # BLD: 4.04 M/UL — SIGNIFICANT CHANGE UP (ref 3.8–5.2)
RBC # FLD: 13.6 % — SIGNIFICANT CHANGE UP (ref 10.3–14.5)
RSV RNA SPEC QL NAA+PROBE: SIGNIFICANT CHANGE UP
RV+EV RNA SPEC QL NAA+PROBE: SIGNIFICANT CHANGE UP
SARS-COV-2 RNA SPEC QL NAA+PROBE: SIGNIFICANT CHANGE UP
SODIUM SERPL-SCNC: 140 MMOL/L — SIGNIFICANT CHANGE UP (ref 135–145)
SP GR SPEC: 1.01 — SIGNIFICANT CHANGE UP (ref 1–1.05)
UROBILINOGEN FLD QL: SIGNIFICANT CHANGE UP
WBC # BLD: 5.48 K/UL — SIGNIFICANT CHANGE UP (ref 3.8–10.5)
WBC # FLD AUTO: 5.48 K/UL — SIGNIFICANT CHANGE UP (ref 3.8–10.5)

## 2022-01-23 PROCEDURE — 99285 EMERGENCY DEPT VISIT HI MDM: CPT

## 2022-01-23 PROCEDURE — 71046 X-RAY EXAM CHEST 2 VIEWS: CPT | Mod: 26

## 2022-01-23 PROCEDURE — 74177 CT ABD & PELVIS W/CONTRAST: CPT | Mod: 26,MA

## 2022-01-23 PROCEDURE — 71275 CT ANGIOGRAPHY CHEST: CPT | Mod: 26,MA

## 2022-01-23 RX ORDER — VANCOMYCIN HCL 1 G
1000 VIAL (EA) INTRAVENOUS ONCE
Refills: 0 | Status: COMPLETED | OUTPATIENT
Start: 2022-01-23 | End: 2022-01-23

## 2022-01-23 RX ORDER — ACETAMINOPHEN 500 MG
975 TABLET ORAL ONCE
Refills: 0 | Status: COMPLETED | OUTPATIENT
Start: 2022-01-23 | End: 2022-01-23

## 2022-01-23 RX ORDER — PIPERACILLIN AND TAZOBACTAM 4; .5 G/20ML; G/20ML
3.38 INJECTION, POWDER, LYOPHILIZED, FOR SOLUTION INTRAVENOUS ONCE
Refills: 0 | Status: COMPLETED | OUTPATIENT
Start: 2022-01-23 | End: 2022-01-23

## 2022-01-23 RX ORDER — SODIUM CHLORIDE 9 MG/ML
1000 INJECTION INTRAMUSCULAR; INTRAVENOUS; SUBCUTANEOUS ONCE
Refills: 0 | Status: COMPLETED | OUTPATIENT
Start: 2022-01-23 | End: 2022-01-23

## 2022-01-23 RX ADMIN — SODIUM CHLORIDE 1000 MILLILITER(S): 9 INJECTION INTRAMUSCULAR; INTRAVENOUS; SUBCUTANEOUS at 19:49

## 2022-01-23 RX ADMIN — PIPERACILLIN AND TAZOBACTAM 200 GRAM(S): 4; .5 INJECTION, POWDER, LYOPHILIZED, FOR SOLUTION INTRAVENOUS at 19:49

## 2022-01-23 RX ADMIN — Medication 975 MILLIGRAM(S): at 19:49

## 2022-01-23 RX ADMIN — Medication 250 MILLIGRAM(S): at 21:17

## 2022-01-23 NOTE — ED PROVIDER NOTE - GASTROINTESTINAL, MLM
Abdomen soft, non-tender, no guarding. Pt refusing to lye down for proper abd exam, states cannot sit on buttocks.

## 2022-01-23 NOTE — ED PROVIDER NOTE - PROGRESS NOTE DETAILS
MAGO Marcus- as per radiology, CT with unchanged cellulitis. will admit to medicine for IV abx for cellulitis given pt still having fevers chills, ID consult

## 2022-01-23 NOTE — ED PROVIDER NOTE - OBJECTIVE STATEMENT
27 y/o female s/p Brazilian butt lift on 1/2/22 [facility] in [place], admitted at [facility] for cellulitis discharged on Keflex, presents to ED c/o persistent fevers, chills, left sided CP x 2 weeks. Pt seen at [facility] had negative PE study, ground glass changes in right lower lobe, covid negative. Pt compliant with taking Keflex, has one pill left. Pt c/o pleuritic left sided chest pain associated with SOB, palpitations and lightheadedness. Pt had recent heath placed for urinary retention, heath removed, denies dysuria. No sore throat, cough, n/v/d.

## 2022-01-23 NOTE — ED PROVIDER NOTE - CLINICAL SUMMARY MEDICAL DECISION MAKING FREE TEXT BOX
25 y/o female s/p Brazilian butt lift on 1/2/22 at [email or web address] in [place], admitted at [facility] for cellulitis discharged on Keflex, presents to ED c/o persistent fevers, chills, left sided CP x 2 weeks. Pt seen at [facility] had negative PE study, ground glass changes in right lower lobe, covid negative. Pt compliant with taking Keflex for buttock cellulitis, has one pill left. Pt c/o pleuritic left sided chest pain associated with SOB, palpitations and lightheadedness. Pt had recent heath placed for urinary retention, heath removed, denies dysuria. concern for sepsis- plan to give IV abx, check labs, lactate, UA r/o UTI, CT abdomen and pelvis r/o abscess vs cellulitis, CTA r/o PE, admit 27 y/o female s/p Brazilian butt lift on 1/2/22 at [facility] in [place], admitted at [facility] for cellulitis discharged on Keflex, presents to ED c/o persistent fevers, chills, left sided CP x 2 weeks. Pt seen at [facility] had negative PE study, ground glass changes in right lower lobe, covid negative. Pt compliant with taking Keflex for buttock cellulitis, has one pill left. Pt c/o pleuritic left sided chest pain associated with SOB, palpitations and lightheadedness. Pt had recent heath placed for urinary retention, heath removed, denies dysuria. concern for sepsis- plan to give IV abx, check labs, lactate, UA r/o UTI, CT abdomen and pelvis r/o abscess vs cellulitis, CTA r/o PE, admit.

## 2022-01-23 NOTE — ED ADULT TRIAGE NOTE - CHIEF COMPLAINT QUOTE
pt c/o fever, dizziness and palpitations x a few days. pt is s/p brazilian butt lift surgery 1/2/22 and was seen 1/9 and treated for sepsis.  pt was told to come to ED by her plastic surgeon

## 2022-01-23 NOTE — ED ADULT NURSE NOTE - OBJECTIVE STATEMENT
Pt presents to ED ambulatory with steady gait c/o intermittent fevers and fatigue. Pt states she was admitted on 1/9 for sepsis after having a BBL procedure. States she was d/c in abx that finish tomorrow night at 9pm. States she was told to come back to the ED if symptoms worsen. States that since she has been discharged, she has bene having temps of 99.9-100 that have been relieved by tylenol, but is worried because her abx end tomorrow and is concerned she will have fevers. States symptoms have not worsened since discharge. All incision sites appear normal. Denies any other medical complaints. Treatment in progress. ANGELINA Gunderson

## 2022-01-24 ENCOUNTER — EMERGENCY (EMERGENCY)
Facility: HOSPITAL | Age: 27
LOS: 1 days | Discharge: HOME | End: 2022-01-24
Attending: EMERGENCY MEDICINE | Admitting: EMERGENCY MEDICINE
Payer: MEDICAID

## 2022-01-24 VITALS
RESPIRATION RATE: 18 BRPM | HEART RATE: 100 BPM | OXYGEN SATURATION: 100 % | DIASTOLIC BLOOD PRESSURE: 86 MMHG | SYSTOLIC BLOOD PRESSURE: 120 MMHG

## 2022-01-24 VITALS
HEART RATE: 100 BPM | OXYGEN SATURATION: 100 % | RESPIRATION RATE: 18 BRPM | SYSTOLIC BLOOD PRESSURE: 127 MMHG | HEIGHT: 61 IN | TEMPERATURE: 99 F | DIASTOLIC BLOOD PRESSURE: 94 MMHG

## 2022-01-24 DIAGNOSIS — Z98.890 OTHER SPECIFIED POSTPROCEDURAL STATES: Chronic | ICD-10-CM

## 2022-01-24 PROCEDURE — 99283 EMERGENCY DEPT VISIT LOW MDM: CPT

## 2022-01-24 PROCEDURE — 99236 HOSP IP/OBS SAME DATE HI 85: CPT

## 2022-01-24 RX ORDER — CEPHALEXIN 500 MG
1 CAPSULE ORAL
Qty: 21 | Refills: 0
Start: 2022-01-24 | End: 2022-01-30

## 2022-01-24 RX ORDER — AZTREONAM 2 G
1 VIAL (EA) INJECTION
Qty: 14 | Refills: 0
Start: 2022-01-24 | End: 2022-01-30

## 2022-01-24 RX ADMIN — Medication 100 MILLIGRAM(S): at 11:28

## 2022-01-24 RX ADMIN — Medication 100 MILLIGRAM(S): at 01:32

## 2022-01-24 NOTE — ED PROVIDER NOTE - NSFOLLOWUPINSTRUCTIONS_ED_ALL_ED_FT
Possible non infectious cellulitis would recommend dermatologic biopsy if no improvement.     Cellulitis    Cellulitis is a skin infection. This condition occurs most often in the arms and lower legs but can occur anywhere over the body. Symptoms include redness, swelling, warm skin, tenderness, and chills/fever. If you were prescribed an antibiotic medicine, take it as told by your health care provider. Do not stop taking the antibiotic even if you start to feel better.    SEEK IMMEDIATE MEDICAL CARE IF YOU HAVE ANY OF THE FOLLOWING SYMPTOMS: worsening fever, red streaks coming from affected area, vomiting or diarrhea, or dizziness/lightheadedness.

## 2022-01-24 NOTE — ED PROVIDER NOTE - ATTENDING CONTRIBUTION TO CARE
I performed a face-to-face evaluation of the patient and performed a history and physical examination. I agree with the history and physical examination. If this was a PA visit, I personally saw the patient with the PA and performed a substantive portion of the visit including all aspects of the medical decision making.     Pollo: Patient had a Brazilian butt lift using her own thought it was harvested from the abdominal wall and the back. No foreign bodies left in there. Over the last 2 to 3 weeks, she’s had inflammatory changes in bilateral buttocks where the surgery was. Was treated as a cellulitis but didn’t improve with Keflex, Bactrim, or clindamycin. Still has low-grade fevers in mild tachycardia. CT scan done recently did not show any fluid collections. WBC not elevated. The area is not very red or warm or tender. Suspect that is perhaps inflammatory response to the surgery rather than a bacterial cellulitis. Patient has appointment with her plastic surgeon tomorrow. Will put that consideration in the discharge note.

## 2022-01-24 NOTE — ED ADULT NURSE REASSESSMENT NOTE - NS ED NURSE REASSESS COMMENT FT1
received report, patient a&ox4 ambulatory being discharged, patient denies chest pain sob, patient waiting for pickup.

## 2022-01-24 NOTE — ED CDU PROVIDER DISPOSITION NOTE - CLINICAL COURSE
MAGO Gonzalez: 25 y/o female s/p Brazilian butt lift on 1/2/22 presented to the ER c/o redness and pain to b/l buttock.  Pt placed in CDU for IV antibiotics.  Pt feels better, redness improved not extending past marked borders.  Pt to follow up with her Surgeon this week.  Discharge and results reviewed with patient.

## 2022-01-24 NOTE — ED CDU PROVIDER DISPOSITION NOTE - NSFOLLOWUPINSTRUCTIONS_ED_ALL_ED_FT
Follow up with your Primary Medical Doctor in 1-2 days.  Follow up with your Surgeon as discussed in 1-2 days,  Take Bactrim 1 tablet orally 2 x a day x 7 days.  Take Keflex 500mg orally 3 x a day x 7 days.  Return to the ER for any persistent/worsening or new symptoms fevers, chills, increased redness/swelling or any concerning symptoms.        Cellulitis    WHAT YOU NEED TO KNOW:    Cellulitis is a skin infection caused by bacteria. Cellulitis is common and can become severe. Cellulitis usually appears on the lower legs. It can also appear on the arms, face, and other areas. Cellulitis develops when bacteria enter a crack or break in your skin, such as a scratch, bite, or cut.    Cellulitis          DISCHARGE INSTRUCTIONS:    Return to the emergency department if:   •Your wound gets larger and more painful.      •You feel a crackling under your skin when you touch it.      •You have purple dots or bumps on your skin, or you see bleeding under your skin.      •You see red streaks coming from the infected area.      Call your doctor if:   •The red, warm, swollen area gets larger.      •Your fever or pain does not go away or gets worse.      •The area does not get smaller after 3 days of antibiotics.      •You have questions or concerns about your condition or care.      Medicines: You should start to see improvement in 3 days. If cellulitis is not treated, the infection can spread through your body and become life-threatening. You may need any of the following medicines:  •Antibiotics help treat the bacterial infection.      •Acetaminophen decreases pain and fever. It is available without a doctor's order. Ask how much to take and how often to take it. Follow directions. Read the labels of all other medicines you are using to see if they also contain acetaminophen, or ask your doctor or pharmacist. Acetaminophen can cause liver damage if not taken correctly. Do not use more than 4 grams (4,000 milligrams) total of acetaminophen in one day.       •NSAIDs, such as ibuprofen, help decrease swelling, pain, and fever. This medicine is available with or without a doctor's order. NSAIDs can cause stomach bleeding or kidney problems in certain people. If you take blood thinner medicine, always ask your healthcare provider if NSAIDs are safe for you. Always read the medicine label and follow directions.      •Take your medicine as directed. Contact your healthcare provider if you think your medicine is not helping or if you have side effects. Tell him or her if you are allergic to any medicine. Keep a list of the medicines, vitamins, and herbs you take. Include the amounts, and when and why you take them. Bring the list or the pill bottles to follow-up visits. Carry your medicine list with you in case of an emergency.      Self-care:   •Wash the area with soap and water every day. Gently pat dry. Use bandages if directed by your healthcare provider.      •Elevate the area above the level of your heart as often as you can. This will help decrease swelling and pain. Prop the area on pillows or blankets to keep it elevated comfortably.  Elevate Leg           •Place a cool, damp cloth on the area. Use clean cloths and clean water. You can do this as often as you need to. Cool, damp cloths may help decrease pain.      •Apply cream or ointment as directed. These help protect the area. Most over-the-counter products, such as petroleum jelly, are good to use. Ask your healthcare provider about specific creams or ointments you should use.      Prevent cellulitis:   •Do not scratch bug bites or areas of injury. You increase your risk for cellulitis by scratching these areas.      •Do not share personal items, such as towels, clothing, and razors.      •Clean exercise equipment with germ-killing  before and after you use it.      •Treat athlete’s foot. This can help prevent the spread of a bacterial skin infection.      •Wash your hands often. Use soap and water. Wash your hands after you use the bathroom, change a child's diapers, or sneeze. Wash your hands before you prepare or eat food. Use lotion to prevent dry, cracked skin.  Handwashing           Follow up with your doctor within 3 days, or as directed: He or she will check if your cellulitis is getting better. Write down your questions so you remember to ask them during your visits.       © Copyright [employer] 2022

## 2022-01-24 NOTE — ED PROVIDER NOTE - PATIENT PORTAL LINK FT
You can access the FollowMyHealth Patient Portal offered by [facility] by registering at the following website: [email or web address]. By joining "[employer]’s FollowMyHealth portal, you will also be able to view your health information using other applications (apps) compatible with our system.

## 2022-01-24 NOTE — ED PROVIDER NOTE - OBJECTIVE STATEMENT
27 y/o female s/p Brazilian butt lift on 1/2/22 at [facility] in [place] with multiple ED visits 2/2 ?cellulitis p/w rash to b/l LE's, low back and butt. Was on Keflex initially never improved, most recently placed in CDU with Clindamycin and discharged on Bactrim Keflex. Had CT yesterday that showed no collection. Pt returns today 2/2 rash to b/l LE's, low back and butt. Pt states redness never improved. Pt has follow up with plastic surgeon tomorrow.

## 2022-01-24 NOTE — ED CDU PROVIDER INITIAL DAY NOTE - PROGRESS NOTE DETAILS
MAGO Gonzalez: 27 y/o female s/p Brazilian butt lift on 1/2/22 presented to the ER c/o redness and pain to b/l buttock.  Pt placed in CDU for IV antibiotics.  Pt feels better, redness improved not extending past marked borders.  Pt to follow up with her Surgeon this week.  Discharge and results reviewed with patient.

## 2022-01-24 NOTE — ED CDU PROVIDER INITIAL DAY NOTE - MEDICAL DECISION MAKING DETAILS
Cellulitis s/p BBL on 1/2/22  continue IV abx - Clindamycin, monitor for fevers. If afebrile overnight and clinically improving. Pt likely d/c in morning with continued PO abx and f/u with her plastic surgeon.

## 2022-01-24 NOTE — ED CDU PROVIDER DISPOSITION NOTE - PATIENT PORTAL LINK FT
You can access the FollowMyHealth Patient Portal offered by [facility] by registering at the following website: [email or web address]. By joining [employer]’s FollowMyHealth portal, you will also be able to view your health information using other applications (apps) compatible with our system.

## 2022-01-24 NOTE — ED CDU PROVIDER INITIAL DAY NOTE - WR ORDER NAME 1
Xray Chest 2 Views PA/Lat
You can access the FollowMyHealth Patient Portal offered by [facility] by registering at the following website: [email or web address]. By joining [employer]’s FollowMyHealth portal, you will also be able to view your health information using other applications (apps) compatible with our system.

## 2022-01-24 NOTE — ED CDU PROVIDER INITIAL DAY NOTE - OBJECTIVE STATEMENT
25 y/o female s/p Brazilian butt lift on 1/2/22 at [facility] [place], admitted at [facility] for cellulitis discharged on Keflex, presents to ED c/o persistent fevers, chills, left sided CP x 2 weeks. Pt seen at [facility] had negative PE study, ground glass changes in right lower lobe, covid negative. Pt compliant with taking Keflex, has one pill left. Pt c/o pleuritic left sided chest pain associated with SOB, palpitations and lightheadedness. Pt had recent heath placed for urinary retention, heath removed, denies dysuria. No sore throat, cough, n/v/d.  CTAP shows no collection. No Leukocyotsis on labs. Pt given vanc/zosyn while in ED. Low grade fever while in ED, took Tylenol PTA. Will continue IV abx - Clindamycin, monitor for fevers. If afebrile overnight and clinically improving. Pt likely d/c in morning with continued PO abx and f/u with her plastic surgeon.

## 2022-01-24 NOTE — ED PROVIDER NOTE - CLINICAL SUMMARY MEDICAL DECISION MAKING FREE TEXT BOX
rash to b/l LE's, low back and butt. ?cellulitis has not improved on multiple abx. ?noninfectious cellulitis. Pt has follow up with Plastic surgeon tomorrow.

## 2022-01-24 NOTE — ED CDU PROVIDER DISPOSITION NOTE - ATTENDING CONTRIBUTION TO CARE
Pt's cellulitis significantly improving from marked area by prior team, no fevers in CDU, feeling well. Plan to d/c home on abx. return precautions discussed, pt states she will call her surgeon today to arrange for f/u

## 2022-01-24 NOTE — ED PROVIDER NOTE - SKIN, MLM
skin appears dry with flaking skin to b/l LE, low back and L gluteus, erythema appears the same as previous demarcated area. Not warm to touch. No evolving cellulitis.

## 2022-01-24 NOTE — ED ADULT TRIAGE NOTE - CHIEF COMPLAINT QUOTE
Pt c/o petechia and itchiness to bilateral legs after starting Bactrim and Cephalexin tonight. Per pt she was seen in ED earlier today for cellulitis on her buttock. States she still has pain on buttock. Denies any sob, voice changes, feeling of throat closing. No stridor or drooling noted, SPO2 100% on room air, respirations even and unlabored.

## 2022-01-25 LAB
CULTURE RESULTS: NO GROWTH — SIGNIFICANT CHANGE UP
SPECIMEN SOURCE: SIGNIFICANT CHANGE UP

## 2022-01-27 ENCOUNTER — INPATIENT (INPATIENT)
Facility: HOSPITAL | Age: 27
LOS: 2 days | Discharge: HOME | End: 2022-01-30
Attending: INTERNAL MEDICINE | Admitting: INTERNAL MEDICINE
Payer: MEDICAID

## 2022-01-27 VITALS
OXYGEN SATURATION: 100 % | SYSTOLIC BLOOD PRESSURE: 124 MMHG | HEART RATE: 126 BPM | DIASTOLIC BLOOD PRESSURE: 86 MMHG | TEMPERATURE: 99 F | RESPIRATION RATE: 20 BRPM | HEIGHT: 61 IN

## 2022-01-27 DIAGNOSIS — Z98.890 OTHER SPECIFIED POSTPROCEDURAL STATES: Chronic | ICD-10-CM

## 2022-01-27 LAB
ALBUMIN SERPL ELPH-MCNC: 4.6 G/DL — SIGNIFICANT CHANGE UP (ref 3.3–5)
ALP SERPL-CCNC: 67 U/L — SIGNIFICANT CHANGE UP (ref 40–120)
ALT FLD-CCNC: 35 U/L — HIGH (ref 4–33)
ANION GAP SERPL CALC-SCNC: 13 MMOL/L — SIGNIFICANT CHANGE UP (ref 7–14)
APPEARANCE UR: CLEAR — SIGNIFICANT CHANGE UP
APTT BLD: 31.1 SEC — SIGNIFICANT CHANGE UP (ref 27–36.3)
AST SERPL-CCNC: 33 U/L — HIGH (ref 4–32)
BASOPHILS # BLD AUTO: 0.02 K/UL — SIGNIFICANT CHANGE UP (ref 0–0.2)
BASOPHILS NFR BLD AUTO: 0.4 % — SIGNIFICANT CHANGE UP (ref 0–2)
BILIRUB SERPL-MCNC: 0.4 MG/DL — SIGNIFICANT CHANGE UP (ref 0.2–1.2)
BILIRUB UR-MCNC: NEGATIVE — SIGNIFICANT CHANGE UP
BLD GP AB SCN SERPL QL: NEGATIVE — SIGNIFICANT CHANGE UP
BLOOD GAS VENOUS COMPREHENSIVE RESULT: SIGNIFICANT CHANGE UP
BUN SERPL-MCNC: 16 MG/DL — SIGNIFICANT CHANGE UP (ref 7–23)
CALCIUM SERPL-MCNC: 9.7 MG/DL — SIGNIFICANT CHANGE UP (ref 8.4–10.5)
CHLORIDE SERPL-SCNC: 98 MMOL/L — SIGNIFICANT CHANGE UP (ref 98–107)
CO2 SERPL-SCNC: 25 MMOL/L — SIGNIFICANT CHANGE UP (ref 22–31)
COLOR SPEC: COLORLESS — SIGNIFICANT CHANGE UP
CREAT SERPL-MCNC: 0.78 MG/DL — SIGNIFICANT CHANGE UP (ref 0.5–1.3)
DIFF PNL FLD: NEGATIVE — SIGNIFICANT CHANGE UP
EOSINOPHIL # BLD AUTO: 0.02 K/UL — SIGNIFICANT CHANGE UP (ref 0–0.5)
EOSINOPHIL NFR BLD AUTO: 0.4 % — SIGNIFICANT CHANGE UP (ref 0–6)
GLUCOSE SERPL-MCNC: 83 MG/DL — SIGNIFICANT CHANGE UP (ref 70–99)
GLUCOSE UR QL: NEGATIVE — SIGNIFICANT CHANGE UP
HCG SERPL-ACNC: <5 MIU/ML — SIGNIFICANT CHANGE UP
HCT VFR BLD CALC: 36.6 % — SIGNIFICANT CHANGE UP (ref 34.5–45)
HGB BLD-MCNC: 12.1 G/DL — SIGNIFICANT CHANGE UP (ref 11.5–15.5)
IANC: 3.29 K/UL — SIGNIFICANT CHANGE UP (ref 1.5–8.5)
IMM GRANULOCYTES NFR BLD AUTO: 0.2 % — SIGNIFICANT CHANGE UP (ref 0–1.5)
INR BLD: 1.19 RATIO — HIGH (ref 0.88–1.16)
KETONES UR-MCNC: NEGATIVE — SIGNIFICANT CHANGE UP
LEUKOCYTE ESTERASE UR-ACNC: NEGATIVE — SIGNIFICANT CHANGE UP
LYMPHOCYTES # BLD AUTO: 0.95 K/UL — LOW (ref 1–3.3)
LYMPHOCYTES # BLD AUTO: 20.2 % — SIGNIFICANT CHANGE UP (ref 13–44)
MCHC RBC-ENTMCNC: 30.1 PG — SIGNIFICANT CHANGE UP (ref 27–34)
MCHC RBC-ENTMCNC: 33.1 GM/DL — SIGNIFICANT CHANGE UP (ref 32–36)
MCV RBC AUTO: 91 FL — SIGNIFICANT CHANGE UP (ref 80–100)
MONOCYTES # BLD AUTO: 0.41 K/UL — SIGNIFICANT CHANGE UP (ref 0–0.9)
MONOCYTES NFR BLD AUTO: 8.7 % — SIGNIFICANT CHANGE UP (ref 2–14)
NEUTROPHILS # BLD AUTO: 3.29 K/UL — SIGNIFICANT CHANGE UP (ref 1.8–7.4)
NEUTROPHILS NFR BLD AUTO: 70.1 % — SIGNIFICANT CHANGE UP (ref 43–77)
NITRITE UR-MCNC: NEGATIVE — SIGNIFICANT CHANGE UP
NRBC # BLD: 0 /100 WBCS — SIGNIFICANT CHANGE UP
NRBC # FLD: 0 K/UL — SIGNIFICANT CHANGE UP
PH UR: 6 — SIGNIFICANT CHANGE UP (ref 5–8)
PLATELET # BLD AUTO: 269 K/UL — SIGNIFICANT CHANGE UP (ref 150–400)
POTASSIUM SERPL-MCNC: 4.5 MMOL/L — SIGNIFICANT CHANGE UP (ref 3.5–5.3)
POTASSIUM SERPL-SCNC: 4.5 MMOL/L — SIGNIFICANT CHANGE UP (ref 3.5–5.3)
PROT SERPL-MCNC: 8.7 G/DL — HIGH (ref 6–8.3)
PROT UR-MCNC: NEGATIVE — SIGNIFICANT CHANGE UP
PROTHROM AB SERPL-ACNC: 13.5 SEC — SIGNIFICANT CHANGE UP (ref 10.6–13.6)
RBC # BLD: 4.02 M/UL — SIGNIFICANT CHANGE UP (ref 3.8–5.2)
RBC # FLD: 13.8 % — SIGNIFICANT CHANGE UP (ref 10.3–14.5)
RH IG SCN BLD-IMP: POSITIVE — SIGNIFICANT CHANGE UP
SODIUM SERPL-SCNC: 136 MMOL/L — SIGNIFICANT CHANGE UP (ref 135–145)
SP GR SPEC: 1.01 — SIGNIFICANT CHANGE UP (ref 1–1.05)
UROBILINOGEN FLD QL: SIGNIFICANT CHANGE UP
WBC # BLD: 4.7 K/UL — SIGNIFICANT CHANGE UP (ref 3.8–10.5)
WBC # FLD AUTO: 4.7 K/UL — SIGNIFICANT CHANGE UP (ref 3.8–10.5)

## 2022-01-27 PROCEDURE — 99285 EMERGENCY DEPT VISIT HI MDM: CPT

## 2022-01-27 PROCEDURE — 71045 X-RAY EXAM CHEST 1 VIEW: CPT | Mod: 26

## 2022-01-27 RX ORDER — SODIUM CHLORIDE 9 MG/ML
1000 INJECTION, SOLUTION INTRAVENOUS ONCE
Refills: 0 | Status: COMPLETED | OUTPATIENT
Start: 2022-01-27 | End: 2022-01-27

## 2022-01-27 RX ORDER — FLUCONAZOLE 150 MG/1
150 TABLET ORAL ONCE
Refills: 0 | Status: COMPLETED | OUTPATIENT
Start: 2022-01-27 | End: 2022-01-27

## 2022-01-27 RX ADMIN — FLUCONAZOLE 150 MILLIGRAM(S): 150 TABLET ORAL at 22:16

## 2022-01-27 RX ADMIN — SODIUM CHLORIDE 1000 MILLILITER(S): 9 INJECTION, SOLUTION INTRAVENOUS at 22:16

## 2022-01-27 NOTE — ED ADULT TRIAGE NOTE - CHIEF COMPLAINT QUOTE
Patient developed a rash on her thighs, upper legs and vaginal area 3 days ago.  Complaining of severe itching and redness.  Used OTC topical cream without relief.  She was admitted to [facility] in Jan for Cellulitis.  Since being discharged from the hospital, patient experiencing tachycardia and fever.  She took Tylenol 2 hours prior to arriving at ED.

## 2022-01-27 NOTE — ED PROVIDER NOTE - CARE PLAN
1 Principal Discharge DX:	Fever of unknown origin  Secondary Diagnosis:	Tachycardia   Principal Discharge DX:	Fever of unknown origin  Secondary Diagnosis:	Tachycardia  Secondary Diagnosis:	Candidiasis of vagina   Principal Discharge DX:	Fever of unknown origin  Secondary Diagnosis:	Tachycardia  Secondary Diagnosis:	Candidiasis of vagina  Secondary Diagnosis:	Dermatitis

## 2022-01-27 NOTE — ED PROVIDER NOTE - OBJECTIVE STATEMENT
27 Y/O F PMH Herniated disc, Renal artery aneurysm states that she had a Brazilian But Lift with abdominoplasty and fat transfer on 1/9 in [place]. States that she last saw her surgeon 3 days ago and was advised to take Bacitracin cream but states that she has had itching on her legs  in-between her legs. Pt states she took Tylenol shortly before ED arrival. States that she has been febrile since January. Pt has had multiple ED visits for eval of cellulitis states she is on the last 2 days of her Keflex. Pt denies any other sx or acute complaints.

## 2022-01-27 NOTE — ED ADULT NURSE NOTE - CHIEF COMPLAINT QUOTE
Patient developed a rash on her thighs, upper legs and vaginal area 3 days ago.  Complaining of severe itching and redness.  Used OTC topical cream without relief.  She was admitted to [facility] in Jan for Cellulitis.  Since being discharged from the hospital, patient experiencing tachycardia and fever.  She took Tylenol 2 hours prior to arriving at ED.

## 2022-01-27 NOTE — ED PROVIDER NOTE - SKIN COLOR
Healing surgical scars, Approx 0.5 CM wound near sacrum, no expressable discharge. Scattered papules along anterior thighs bilaterally.

## 2022-01-27 NOTE — ED ADULT TRIAGE NOTE - NS ED TRIAGE AVPU SCALE
Alert-The patient is alert, awake and responds to voice. The patient is oriented to time, place, and person. The triage nurse is able to obtain subjective information.
Mitral valve regurgitation

## 2022-01-27 NOTE — ED PROVIDER NOTE - ABDOMINAL EXAM
No abdominal tenderness to palpation. Healing transverse scar allong the lower abdomen, B/L gluteal area

## 2022-01-27 NOTE — ED PROVIDER NOTE - CLINICAL SUMMARY MEDICAL DECISION MAKING FREE TEXT BOX
27 Y/O F PMH Herniated disc, Renal artery aneurysm states that she had a Brazilian But Lift with abdominoplasty and fat transfer on 1/9 in [place]. States that she last saw her surgeon 3 days ago and was advised to take Bacitracin cream but states that she has had itching on her legs  in-between her legs. Pt states she took Tylenol shortly before ED arrival. Plan is admission for fever of unknown origin, labs to eval for anemia or electrolyte disturbance, CT to eval for acute drainable collection.

## 2022-01-27 NOTE — ED ADULT NURSE NOTE - OBJECTIVE STATEMENT
pt received to int 7 , a&ox 4 and ambulatory  , p/w increased rash and itchiness to thigh and vaginal area x 3days. Verbalized no relief with OTC cream. Pt breathing even and unlabored on room air. Denies fever, chills, cough, SOB, chest pain, palpitations, dizziness, N/V/D, numbness, tingling. 20G IV placed to RAC. Labs collected and sent. CT pending . Stretcher in lowest position, wheels locked, appropriate side rails in place, call bell in reach.

## 2022-01-27 NOTE — ED PROVIDER NOTE - ATTENDING CONTRIBUTION TO CARE
26F with recent Brazilian But Lift with abdominoplasty and fat transfer on 1/9/22 returns with continued fevers. patient was seen and admitted to [facility] for cellulitis over upper thighs. also reports itchy skin over buttocks and upper thighs and groin area. saw her surgeon 3 days ago who added bacitracin ointment to treat skin. 26F with recent Brazilian But Lift with abdominoplasty and fat transfer on 1/9/22 returns with continued fevers. patient was seen and admitted to [facility] for cellulitis over upper thighs. also reports itchy skin over buttocks and upper thighs and groin area. saw her surgeon 3 days ago who added bacitracin ointment to treat skin. + continued daily fevers. no cough, dysuria, rash, sob, cp.    ***GEN - NAD; well appearing; A+O x3 ***HEAD - NC/AT ***EYES/NOSE - PERRL, EOMI, mucous membranes moist, no discharge ***THROAT: Oral cavity and pharynx normal. No inflammation, swelling, exudate, or lesions.  ***NECK: Neck supple, non-tender without lymphadenopathy, no masses, no thyromegaly.   ***PULMONARY - CTA b/l, symmetric breath sounds. ***CARDIAC -s1s2, RRR, no M,G,R  ***ABDOMEN - +BS, ND, NT, soft, no guarding, no rebound, no masses   ***BACK - no CVA tenderness, Normal  spine ***EXTREMITIES - symmetric pulses, 2+ dp, capillary refill < 2 seconds, no clubbing, no cyanosis, no edema ***SKIN - dry, flaking skin over b/l upper thighs and buttocks. ***NEUROLOGIC - alert, CN 2-12 intact    MDM: 26F with continued fevers. well appearing. currently taking po keflex and topical bacitracin for cellulitis after BBL. will admit for fever of unknown origin.

## 2022-01-27 NOTE — ED ADULT NURSE NOTE - NSIMPLEMENTINTERV_GEN_ALL_ED
Implemented All Universal Safety Interventions:  [place] to call system. Call bell, personal items and telephone within reach. Instruct patient to call for assistance. Room bathroom lighting operational. Non-slip footwear when patient is off stretcher. Physically safe environment: no spills, clutter or unnecessary equipment. Stretcher in lowest position, wheels locked, appropriate side rails in place.

## 2022-01-27 NOTE — ED PROVIDER NOTE - PHYSICAL EXAMINATION
: White clumpy external discharge. No lesions on external genitalia. MAGO Mtz chaperoned by MD Galvan.

## 2022-01-28 DIAGNOSIS — R21 RASH AND OTHER NONSPECIFIC SKIN ERUPTION: ICD-10-CM

## 2022-01-28 DIAGNOSIS — R42 DIZZINESS AND GIDDINESS: ICD-10-CM

## 2022-01-28 DIAGNOSIS — R74.01 ELEVATION OF LEVELS OF LIVER TRANSAMINASE LEVELS: ICD-10-CM

## 2022-01-28 DIAGNOSIS — R65.10 SYSTEMIC INFLAMMATORY RESPONSE SYNDROME (SIRS) OF NON-INFECTIOUS ORIGIN WITHOUT ACUTE ORGAN DYSFUNCTION: ICD-10-CM

## 2022-01-28 DIAGNOSIS — R09.89 OTHER SPECIFIED SYMPTOMS AND SIGNS INVOLVING THE CIRCULATORY AND RESPIRATORY SYSTEMS: ICD-10-CM

## 2022-01-28 DIAGNOSIS — R50.9 FEVER, UNSPECIFIED: ICD-10-CM

## 2022-01-28 DIAGNOSIS — R00.0 TACHYCARDIA, UNSPECIFIED: ICD-10-CM

## 2022-01-28 DIAGNOSIS — Z29.9 ENCOUNTER FOR PROPHYLACTIC MEASURES, UNSPECIFIED: ICD-10-CM

## 2022-01-28 DIAGNOSIS — N89.8 OTHER SPECIFIED NONINFLAMMATORY DISORDERS OF VAGINA: ICD-10-CM

## 2022-01-28 LAB
ALBUMIN SERPL ELPH-MCNC: 3.7 G/DL — SIGNIFICANT CHANGE UP (ref 3.3–5)
ALP SERPL-CCNC: 54 U/L — SIGNIFICANT CHANGE UP (ref 40–120)
ALT FLD-CCNC: 27 U/L — SIGNIFICANT CHANGE UP (ref 4–33)
ANION GAP SERPL CALC-SCNC: 10 MMOL/L — SIGNIFICANT CHANGE UP (ref 7–14)
APTT BLD: 30.3 SEC — SIGNIFICANT CHANGE UP (ref 27–36.3)
AST SERPL-CCNC: 27 U/L — SIGNIFICANT CHANGE UP (ref 4–32)
BACTERIA # UR AUTO: NEGATIVE — SIGNIFICANT CHANGE UP
BASOPHILS # BLD AUTO: 0.03 K/UL — SIGNIFICANT CHANGE UP (ref 0–0.2)
BASOPHILS NFR BLD AUTO: 0.7 % — SIGNIFICANT CHANGE UP (ref 0–2)
BILIRUB SERPL-MCNC: 0.3 MG/DL — SIGNIFICANT CHANGE UP (ref 0.2–1.2)
BUN SERPL-MCNC: 13 MG/DL — SIGNIFICANT CHANGE UP (ref 7–23)
CALCIUM SERPL-MCNC: 8.9 MG/DL — SIGNIFICANT CHANGE UP (ref 8.4–10.5)
CHLORIDE SERPL-SCNC: 101 MMOL/L — SIGNIFICANT CHANGE UP (ref 98–107)
CO2 SERPL-SCNC: 26 MMOL/L — SIGNIFICANT CHANGE UP (ref 22–31)
CREAT SERPL-MCNC: 0.83 MG/DL — SIGNIFICANT CHANGE UP (ref 0.5–1.3)
CRP SERPL-MCNC: <4 MG/L — SIGNIFICANT CHANGE UP
D DIMER BLD IA.RAPID-MCNC: 777 NG/ML DDU — HIGH
EOSINOPHIL # BLD AUTO: 0 K/UL — SIGNIFICANT CHANGE UP (ref 0–0.5)
EOSINOPHIL NFR BLD AUTO: 0 % — SIGNIFICANT CHANGE UP (ref 0–6)
EPI CELLS # UR: 1 /HPF — SIGNIFICANT CHANGE UP (ref 0–5)
ERYTHROCYTE [SEDIMENTATION RATE] IN BLOOD: 28 MM/HR — HIGH (ref 4–25)
GLUCOSE SERPL-MCNC: 92 MG/DL — SIGNIFICANT CHANGE UP (ref 70–99)
GRAN CASTS # UR COMP ASSIST: 1 /LPF — HIGH
HCT VFR BLD CALC: 32.2 % — LOW (ref 34.5–45)
HGB BLD-MCNC: 10.6 G/DL — LOW (ref 11.5–15.5)
IANC: 2.27 K/UL — SIGNIFICANT CHANGE UP (ref 1.5–8.5)
IMM GRANULOCYTES NFR BLD AUTO: 0.2 % — SIGNIFICANT CHANGE UP (ref 0–1.5)
INR BLD: 1.26 RATIO — HIGH (ref 0.88–1.16)
LYMPHOCYTES # BLD AUTO: 1.35 K/UL — SIGNIFICANT CHANGE UP (ref 1–3.3)
LYMPHOCYTES # BLD AUTO: 32.5 % — SIGNIFICANT CHANGE UP (ref 13–44)
MAGNESIUM SERPL-MCNC: 1.8 MG/DL — SIGNIFICANT CHANGE UP (ref 1.6–2.6)
MCHC RBC-ENTMCNC: 30.5 PG — SIGNIFICANT CHANGE UP (ref 27–34)
MCHC RBC-ENTMCNC: 32.9 GM/DL — SIGNIFICANT CHANGE UP (ref 32–36)
MCV RBC AUTO: 92.5 FL — SIGNIFICANT CHANGE UP (ref 80–100)
MONOCYTES # BLD AUTO: 0.5 K/UL — SIGNIFICANT CHANGE UP (ref 0–0.9)
MONOCYTES NFR BLD AUTO: 12 % — SIGNIFICANT CHANGE UP (ref 2–14)
NEUTROPHILS # BLD AUTO: 2.27 K/UL — SIGNIFICANT CHANGE UP (ref 1.8–7.4)
NEUTROPHILS NFR BLD AUTO: 54.6 % — SIGNIFICANT CHANGE UP (ref 43–77)
NRBC # BLD: 0 /100 WBCS — SIGNIFICANT CHANGE UP
NRBC # FLD: 0 K/UL — SIGNIFICANT CHANGE UP
PHOSPHATE SERPL-MCNC: 4.1 MG/DL — SIGNIFICANT CHANGE UP (ref 2.5–4.5)
PLATELET # BLD AUTO: 242 K/UL — SIGNIFICANT CHANGE UP (ref 150–400)
POTASSIUM SERPL-MCNC: 4.1 MMOL/L — SIGNIFICANT CHANGE UP (ref 3.5–5.3)
POTASSIUM SERPL-SCNC: 4.1 MMOL/L — SIGNIFICANT CHANGE UP (ref 3.5–5.3)
PROCALCITONIN SERPL-MCNC: 0.12 NG/ML — HIGH (ref 0.02–0.1)
PROT SERPL-MCNC: 7.2 G/DL — SIGNIFICANT CHANGE UP (ref 6–8.3)
PROTHROM AB SERPL-ACNC: 14.3 SEC — HIGH (ref 10.6–13.6)
RBC # BLD: 3.48 M/UL — LOW (ref 3.8–5.2)
RBC # FLD: 13.9 % — SIGNIFICANT CHANGE UP (ref 10.3–14.5)
SARS-COV-2 RNA SPEC QL NAA+PROBE: SIGNIFICANT CHANGE UP
SODIUM SERPL-SCNC: 137 MMOL/L — SIGNIFICANT CHANGE UP (ref 135–145)
WBC # BLD: 4.16 K/UL — SIGNIFICANT CHANGE UP (ref 3.8–10.5)
WBC # FLD AUTO: 4.16 K/UL — SIGNIFICANT CHANGE UP (ref 3.8–10.5)
WBC UR QL: 1 /HPF — SIGNIFICANT CHANGE UP (ref 0–5)

## 2022-01-28 PROCEDURE — 99223 1ST HOSP IP/OBS HIGH 75: CPT

## 2022-01-28 PROCEDURE — 93970 EXTREMITY STUDY: CPT | Mod: 26

## 2022-01-28 PROCEDURE — 99222 1ST HOSP IP/OBS MODERATE 55: CPT

## 2022-01-28 PROCEDURE — 74177 CT ABD & PELVIS W/CONTRAST: CPT | Mod: 26

## 2022-01-28 RX ORDER — VANCOMYCIN HCL 1 G
1000 VIAL (EA) INTRAVENOUS ONCE
Refills: 0 | Status: COMPLETED | OUTPATIENT
Start: 2022-01-28 | End: 2022-01-28

## 2022-01-28 RX ORDER — SODIUM CHLORIDE 9 MG/ML
1000 INJECTION, SOLUTION INTRAVENOUS
Refills: 0 | Status: DISCONTINUED | OUTPATIENT
Start: 2022-01-28 | End: 2022-01-30

## 2022-01-28 RX ORDER — ACETAMINOPHEN 500 MG
650 TABLET ORAL EVERY 6 HOURS
Refills: 0 | Status: DISCONTINUED | OUTPATIENT
Start: 2022-01-28 | End: 2022-01-30

## 2022-01-28 RX ORDER — ENOXAPARIN SODIUM 100 MG/ML
40 INJECTION SUBCUTANEOUS EVERY 24 HOURS
Refills: 0 | Status: DISCONTINUED | OUTPATIENT
Start: 2022-01-28 | End: 2022-01-30

## 2022-01-28 RX ORDER — VANCOMYCIN HCL 1 G
1000 VIAL (EA) INTRAVENOUS ONCE
Refills: 0 | Status: DISCONTINUED | OUTPATIENT
Start: 2022-01-28 | End: 2022-01-28

## 2022-01-28 RX ADMIN — ENOXAPARIN SODIUM 40 MILLIGRAM(S): 100 INJECTION SUBCUTANEOUS at 11:42

## 2022-01-28 RX ADMIN — SODIUM CHLORIDE 100 MILLILITER(S): 9 INJECTION, SOLUTION INTRAVENOUS at 08:05

## 2022-01-28 RX ADMIN — Medication 250 MILLIGRAM(S): at 01:56

## 2022-01-28 NOTE — CONSULT NOTE ADULT - ATTENDING COMMENTS
26 year old s/p abdominalplasty and "brazilian but lift" on 1/2    On 1/6, she developed ever/chills and abdominal and chest pain    Seen around 1/10 with concern for post op infection/ cellulitis    Discharged on 1/12, with cefazolin  Recent urine retention- requiring heath    1) Fever  100.7 yesterday  Follow up blood cultures    Associated with rash-     Cellulitis has resolved  CT a/p without collections  CT angio without E    Favor observing off antibiotics      2) Skin eruption  bilateral things/ itching  ? drug reaction  Consider outpt allergy eval for skin testing for pcn/ cephalosporin      3) Urine retention  Work up per primary team    4) Vaginal itching  S/p empiric fluconazole  Outpt gym follow up if persists    Follow up with her outpt surgeon    Call ID service with additional questions

## 2022-01-28 NOTE — H&P ADULT - NSHPSOCIALHISTORY_GEN_ALL_CORE
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

## 2022-01-28 NOTE — H&P ADULT - PROBLEM SELECTOR PLAN 1
unclear etiology, cannot r/o infection however no leukocytosis, CRP wnl  minimally elevated ESR  fevers intermittently since surgery per patient, had recent visit to surgery who recommended topical bacitracin however patient did not start on that of yet  f/u BCx  check procalcitonin  given tachycardia and recent op, check d-dimer  c/w vancomycin for now as meeting SIRS criteria, would obtain ID consult in AM  f/u read of CT unclear etiology, cannot r/o infection however no leukocytosis, CRP wnl  minimally elevated ESR  fevers intermittently since surgery per patient, had recent visit to surgery who recommended topical bacitracin however patient did not start on that of yet  f/u BCx  check procalcitonin  given tachycardia and recent op, check d-dimer  c/w vancomycin for now x1 as meeting SIRS criteria, would obtain ID consult in AM for recs  f/u read of CT

## 2022-01-28 NOTE — CONSULT NOTE ADULT - ASSESSMENT
26-year-old female with history of renal artery enlargement, herniated disc, recent plastic surgery 1/02/22 Brazilian butt lift with abdominoplasty and fat transfercomplicated by post-op buttock fat necrosis/cellulitis s/p admission on 1/9-1/12 ( tx with Vanc/Cefipime, then cefazolin, sent home on cephalexin) s/p more than one course of cephalexin and possibly bactrim currently presenting with fever x1 day, ongoig dizziness as well as new maculopapular pruritic rash on anterior distal thighs with new vaginal itching and scant white discharge that started 3d ago. Buttock cellulitis and symptoms resolved. Normal WBC, procalcitonin and CRP. Physical exam non-revealing of signs of infection. It is more likely that patient's fevers are secondary to post-op fat necrosis and/or an antibiotic reaction given presence of rash. Unclear which antibiotic is causing the rash but appears to be a delayed hypersensitivity. In addition, patient with hx of lip & cheek swelling while hospitalized, unclear to which antibiotic.   Advised patient to refrain and be cautious about the use of sulfa drugs ( including bactrim) as well as penicillins and cephalosporins until evaluated by allergist.   Recommend:   - Discontinuation of antibiotics   - Referral to allergist for drug allergy evaluation   - Rest of care per primary team  26-year-old female with history of renal artery enlargement, herniated disc, recent plastic surgery 1/02/22 Brazilian butt lift with abdominoplasty and fat transfercomplicated by post-op buttock fat necrosis/cellulitis s/p admission on 1/9-1/12 ( tx with Vanc/Cefipime, then cefazolin, sent home on cephalexin) s/p more than one course of cephalexin and possibly bactrim currently presenting with fever x1 day, ongoig dizziness as well as new maculopapular pruritic rash on anterior distal thighs with new vaginal itching and scant white discharge that started 3d ago. Buttock cellulitis and symptoms resolved. Normal WBC, procalcitonin and CRP. Physical exam non-revealing of signs of infection.   It is more likely that patient's fevers are secondary to post-op fat necrosis and/or an antibiotic reaction given presence of rash. Unclear which antibiotic is causing the rash but appears to be a delayed hypersensitivity. In addition, patient with hx of lip & cheek swelling while hospitalized, unclear to which antibiotic.   Agree with x1 fluconazole for vaginal discharge and itch given receipt of multiple antibiotic courses putting her at risk of a fungal infection   Advised patient to refrain and be cautious about the use of sulfa drugs ( including bactrim) as well as penicillins and cephalosporins until evaluated by allergist.   Recommend:   - Discontinuation of antibiotics   - Referral to allergist for drug allergy evaluation   - If vaginal itching and rash persistent would recommend a GYN evaluation as outpatient   - Rest of care per primary team

## 2022-01-28 NOTE — H&P ADULT - NSHPREVIEWOFSYSTEMS_GEN_ALL_CORE
REVIEW OF SYSTEMS:    CONSTITUTIONAL: No weakness, (+) fevers, chills, night sweats  EYES/ENT: No visual changes; No dysphagia; No sore throat; No rhinorrhea; No sinus pain/pressure; (+)nasal congestion  NECK: No pain or stiffness  RESPIRATORY: No cough, wheezing, hemoptysis; No shortness of breath  CARDIOVASCULAR: No chest pain or palpitations; No lower extremity edema  GASTROINTESTINAL: No abdominal or epigastric pain. (+) nausea; No vomiting or hematemesis; No diarrhea or constipation. No melena or hematochezia.  GENITOURINARY: No dysuria, frequency or hematuria; (+)white vaginal discharge and pruritis   NEUROLOGICAL: No numbness or weakness; No syncope; (+)intermittent HA and dizziness   MSK: ambulates without aid; no falls  SKIN: No itching, burning, rashes, or lesions   All other review of systems is negative unless indicated above.

## 2022-01-28 NOTE — H&P ADULT - PROBLEM SELECTOR PLAN 4
may benefit from derm eval, has outpatient derm appointment scheduled for next week however  continue to monitor  f/u ID recs  continue to hold trimethoprim/sulfamethoxazole

## 2022-01-28 NOTE — PATIENT PROFILE ADULT - FALL HARM RISK - UNIVERSAL INTERVENTIONS
Call bell, personal items and telephone in reach/Instruct patient to call for assistance before getting out of bed or chair/Non-slip footwear when patient is out of bed/[place] to call system/Physically safe environment - no spills, clutter or unnecessary equipment/Purposeful Proactive Rounding/Room/bathroom lighting operational, light cord in reach

## 2022-01-28 NOTE — H&P ADULT - PROBLEM SELECTOR PLAN 2
TSH wnl  given tachycardia and recent op, check d-dimer  possibly infectious origin  continue to monitor

## 2022-01-28 NOTE — H&P ADULT - ASSESSMENT
26-year-old female with history of renal artery enlargement, herniated disc, recent plastic surgery 1/02/22, complicated by post-op infection s/p multiple courses of antibiotics as well as a prior admission (1/09-1/12), presenting with persistent fevers since her surgery, dizziness, and dry peeling skin over buttocks x1wk as well as new maculopapular rash on anterior distal thighs

## 2022-01-28 NOTE — CONSULT NOTE ADULT - SUBJECTIVE AND OBJECTIVE BOX
Patient is a 26y old  Female who presents with a chief complaint of persistent fevers; rash (2022 05:55)    HPI:  26-year-old female with history of renal artery enlargement, herniated disc, recent plastic surgery 22, complicated by post-op infection s/p multiple courses of antibiotics as well as a prior admission (-), presenting with persistent fevers since her surgery, dizziness, and dry peeling skin over buttocks x1wk as well as new maculopapular rash on anterior distal thighs that started 3d ago, and is pruritic. She started using an aloe lotion however discontinued it due to increased pruritis with new vaginal itching and scant white discharge. She has been on multiple courses of cephalexin and was started on Bactrim Rx'ed  however this was discontinued due to onset of rash. She also reports subjective tachycardia, occasional left sided chest pain with deep breathing and lightheadedness, worse with standing and with poor PO intake associated with HA without vision changes. She is not on OCPs and has no hormone use, no history of DVT/PE. She has had no recent travel, no sick contacts. She reports that since her surgery she has been having ~daily fevers (Tm 100.7).      In the ED VS: 100.7  110-126  118-127/66-90  16-20  100%RA, received vancomycin 1g IVPB x1, fluconazole 150mg PO x1, and LR 2L IVF (2022 05:55)       prior hospital charts reviewed [  ]  primary team notes reviewed [  ]  other consultant notes reviewed [  ]    PAST MEDICAL & SURGICAL HISTORY:  History of herniated intervertebral disc    Aneurysm of renal artery in native kidney    S/P plastic surgery        Allergies  glyceryl thioglycolate (Rash)  No Known Drug Allergies  Shrimp (Hives)    ANTIMICROBIALS (past 90 days)  MEDICATIONS  (STANDING):  fluconAZOLE   Tablet   150 milliGRAM(s) Oral (22 @ 22:16)    vancomycin  IVPB   250 mL/Hr IV Intermittent (22 @ 01:56)        vancomycin  IVPB 1000 once    MEDICATIONS  (STANDING):  acetaminophen     Tablet .. 650 every 6 hours PRN  enoxaparin Injectable 40 every 24 hours    SOCIAL HISTORY:       FAMILY HISTORY:  FHx: diabetes mellitus (Father, Mother)    FH: HTN (hypertension) (Father)    Family history of early CAD    FH: CAD (coronary artery disease) (Father)      REVIEW OF SYSTEMS  [  ] ROS unobtainable because:    [  ] All other systems negative except as noted below:	    Constitutional:  [ ] fever [ ] chills  [ ] weight loss  [ ] weakness  Skin:  [ ] rash [ ] phlebitis	  Eyes: [ ] icterus [ ] pain  [ ] discharge	  ENMT: [ ] sore throat  [ ] thrush [ ] ulcers [ ] exudates  Respiratory: [ ] dyspnea [ ] hemoptysis [ ] cough [ ] sputum	  Cardiovascular:  [ ] chest pain [ ] palpitations [ ] edema	  Gastrointestinal:  [ ] nausea [ ] vomiting [ ] diarrhea [ ] constipation [ ] pain	  Genitourinary:  [ ] dysuria [ ] frequency [ ] hematuria [ ] discharge [ ] flank pain  [ ] incontinence  Musculoskeletal:  [ ] myalgias [ ] arthralgias [ ] arthritis  [ ] back pain  Neurological:  [ ] headache [ ] seizures  [ ] confusion/altered mental status  Psychiatric:  [ ] anxiety [ ] depression	  Hematology/Lymphatics:  [ ] lymphadenopathy  Endocrine:  [ ] adrenal [ ] thyroid  Allergic/Immunologic:	 [ ] transplant [ ] seasonal    Vital Signs Last 24 Hrs  T(F): 99 (22 @ 08:38), Max: 100.7 (22 @ 22:21)  Vital Signs Last 24 Hrs  HR: 91 (22 @ 08:38) (91 - 126)  BP: 101/62 (22 @ 08:38) (101/62 - 127/90)  RR: 18 (22 @ 08:38)  SpO2: 100% (22 @ 05:05) (100% - 100%)  Wt(kg): --    PHYSICAL EXAM:  Constitutional: non-toxic, no distress  HEAD/EYES: anicteric, no conjunctival injection  ENT:  supple, no thrush  Cardiovascular:   normal S1, S2, no murmur, no edema  Respiratory:  clear BS bilaterally, no wheezes, no rales  GI:  soft, non-tender, normal bowel sounds  :  no heath, no CVA tenderness  Musculoskeletal:  no synovitis, normal ROM  Neurologic: awake and alert, normal strength, no focal findings  Skin:  no rash, no erythema, no phlebitis  Heme/Onc: no lymphadenopathy   Psychiatric:  awake, alert, appropriate mood                            10.6   4.16  )-----------( 242      ( 2022 06:59 )             32.2       137  |  101  |  13  ----------------------------<  92  4.1   |  26  |  0.83    Ca    8.9      2022 06:59  Phos  4.1       Mg     1.80         TPro  7.2  /  Alb  3.7  /  TBili  0.3  /  DBili  x   /  AST  27  /  ALT  27  /  AlkPhos  54      Urinalysis Basic - ( 2022 23:32 )    Color: Colorless / Appearance: Clear / S.008 / pH: x  Gluc: x / Ketone: Negative  / Bili: Negative / Urobili: <2 mg/dL   Blood: x / Protein: Negative / Nitrite: Negative   Leuk Esterase: Negative / RBC: x / WBC 1 /HPF   Sq Epi: x / Non Sq Epi: 1 /HPF / Bacteria: Negative    MICROBIOLOGY:  Culture - Blood (collected 2022 06:34)  Source: .Blood Blood-Peripheral  Preliminary Report (2022 07:01):    No growth to date.    Culture - Blood (collected 2022 06:34)  Source: .Blood Blood-Peripheral  Preliminary Report (2022 07:01):    No growth to date.    Culture - Urine (collected 2022 18:52)  Source: Clean Catch Clean Catch (Midstream)  Final Report (2022 07:38):    No growth              Rapid RVP Result: Aguedatec ( @ 20:02)      RADIOLOGY:  imaging below personally reviewed and agree with findings           Patient is a 26y old  Female who presents with a chief complaint of persistent fevers; rash (2022 05:55)    HPI:  26-year-old female with history of renal artery enlargement, herniated disc, recent plastic surgery 22 ( Brazilian butt lift with abdominoplasty and fat transfer) in [place] by , complicated by post-op fat necrosis/cellulitis (CT A/P showing buttock subQ edema, no collection to drain) admitted on - ( tx with Vanc/Cefipime, then cefazolin, sent home on cephalexin) presented with fever, chills, R & L buttock erythema and pain as well as dizziness.   Presented again on  to the ED for urinary retention ( multiple small voids , no dysuria), heath placed sent home with, no UTI. Returned on  for removal of heath, ongoing chest pain, CT chest angio to rule out PE negative, repeat CT A/P showing persistent subq edema, Mild bilateral hydroureteronephrosis is again seen secondary to a markedly distended bladder.    Then - admitted for same above complaints. CT chest angio/A/P performed, no PE., no collections, no mentions of subq edema. She received clindamycin and was sent home on Bactrim and Cephalexin, although claims to only taking cephalexin for the past 3 days.     Yesterday, presenting with persistent fevers, dizziness, and dry peeling skin over buttocks x1wk as well as new maculopapular pruritic rash on anterior distal thighs that started 3d ago. She started using an aloe lotion however discontinued it due to increased pruritis with new vaginal itching and scant white discharge. Claims that rash has come and go. Claims vaginal itching comes along only when thigh itching starts. After clarification of claimed persistent fevers, it appears that patient has not had persistent fevers except for initial presentation on  as well as this presentation . She also reports occasional left sided chest pain without SOB. No current urinary symptoms, diarrhea nor vomiting.   To note, she developed lip and cheek swelling while receiving an antibiotic infusion during the recent hospitalizations at [facility] but unsure which antibiotic.     She is not on OCPs and has no hormone use, no history of DVT/PE. She has had no recent travel, no sick contacts.     In the ED VS: 100.7  110-126  118-127/66-90  16-20  100%RA, received vancomycin 1g IVPB x1, fluconazole 150mg PO x1, and LR 2L IVF. Blood cx's x2 and urine cx sent        prior hospital charts reviewed [ x ]  primary team notes reviewed [ x ]  other consultant notes reviewed [x  ]    PAST MEDICAL & SURGICAL HISTORY:  History of herniated intervertebral disc    Aneurysm of renal artery in native kidney    S/P plastic surgery        Allergies  glyceryl thioglycolate (Rash)  No Known Drug Allergies  Shrimp (Hives)    ANTIMICROBIALS (past 90 days)  MEDICATIONS  (STANDING):  fluconAZOLE   Tablet   150 milliGRAM(s) Oral (22 @ 22:16)    vancomycin  IVPB   250 mL/Hr IV Intermittent (22 @ 01:56)        vancomycin  IVPB 1000 once    MEDICATIONS  (STANDING):  acetaminophen     Tablet .. 650 every 6 hours PRN  enoxaparin Injectable 40 every 24 hours    SOCIAL HISTORY:       FAMILY HISTORY:  FHx: diabetes mellitus (Father, Mother)    FH: HTN (hypertension) (Father)    Family history of early CAD    FH: CAD (coronary artery disease) (Father)      REVIEW OF SYSTEMS  [  ] ROS unobtainable because:    [ x ] All other systems negative except as noted below:	    Constitutional:  [ x] fever [x ] chills  [ ] weight loss  [ ] weakness  Skin:  [ x] rash [ ] phlebitis	  Eyes: [ ] icterus [ ] pain  [ ] discharge	  ENMT: [ ] sore throat  [ ] thrush [ ] ulcers [ ] exudates  Respiratory: [ ] dyspnea [ ] hemoptysis [ ] cough [ ] sputum	  Cardiovascular:  [x ] chest pain [ x] palpitations [ ] edema	  Gastrointestinal:  [ ] nausea [ ] vomiting [ ] diarrhea [ ] constipation [ ] pain	  Genitourinary:  [ ] dysuria [ ] frequency [ ] hematuria [ ] discharge [ ] flank pain  [ ] incontinence  Musculoskeletal:  [ ] myalgias [ ] arthralgias [ ] arthritis  [ ] back pain  Neurological:  [ ] headache [ ] seizures  [ ] confusion/altered mental status  Psychiatric:  [ ] anxiety [ ] depression	  Hematology/Lymphatics:  [ ] lymphadenopathy  Endocrine:  [ ] adrenal [ ] thyroid  Allergic/Immunologic:	 [ ] transplant [ ] seasonal    Vital Signs Last 24 Hrs  T(F): 99 (22 @ 08:38), Max: 100.7 (22 @ 22:21)  Vital Signs Last 24 Hrs  HR: 91 (22 @ 08:38) (91 - 126)  BP: 101/62 (22 @ 08:38) (101/62 - 127/90)  RR: 18 (22 @ 08:38)  SpO2: 100% (22 @ 05:05) (100% - 100%)  Wt(kg): --    PHYSICAL EXAM:  Constitutional: non-toxic, no distress  HEAD/EYES: anicteric, no conjunctival injection  ENT:  supple, no thrush  Cardiovascular:   normal S1, S2, no murmur, no edema  Respiratory:  clear BS bilaterally, no wheezes, no rales  GI:  soft, normal bowel sounds, mild tenderness to palpation diffusely  :  no heath, no CVA tenderness  Musculoskeletal:  no synovitis, normal ROM  Neurologic: awake and alert, normal strength, no focal findings  Skin:  no rash, no erythema, no phlebitis  Heme/Onc: no lymphadenopathy   Psychiatric:  awake, alert, appropriate mood                            10.6   4.16  )-----------( 242      ( 2022 06:59 )             32.2       137  |  101  |  13  ----------------------------<  92  4.1   |  26  |  0.83    Ca    8.9      2022 06:59  Phos  4.1       Mg     1.80         TPro  7.2  /  Alb  3.7  /  TBili  0.3  /  DBili  x   /  AST  27  /  ALT  27  /  AlkPhos  54      Procalcitonin, Serum (22 @ 06:59)    Procalcitonin, Serum: 0.12:    C-Reactive Protein, Serum (22 @ 23:32)    C-Reactive Protein, Serum: <4.0 mg/L    Sedimentation Rate, Erythrocyte (22 @ 23:32)    Sedimentation Rate, Erythrocyte: 28 mm/hr      Urinalysis Basic - ( 2022 23:32 )    Color: Colorless / Appearance: Clear / S.008 / pH: x  Gluc: x / Ketone: Negative  / Bili: Negative / Urobili: <2 mg/dL   Blood: x / Protein: Negative / Nitrite: Negative   Leuk Esterase: Negative / RBC: x / WBC 1 /HPF   Sq Epi: x / Non Sq Epi: 1 /HPF / Bacteria: Negative    MICROBIOLOGY:  Culture - Blood (collected 2022 06:34)  Source: .Blood Blood-Peripheral  Preliminary Report (2022 07:01):    No growth to date.    Culture - Blood (collected 2022 06:34)  Source: .Blood Blood-Peripheral  Preliminary Report (2022 07:01):    No growth to date.    Culture - Urine (collected 2022 18:52)  Source: Clean Catch Clean Catch (Midstream)  Final Report (2022 07:38):    No growth      Rapid RVP Result: NotDetec ( @ 20:02)      RADIOLOGY:  imaging below personally reviewed and agree with findings      CT C/A/P ()":   FINDINGS:  LOWER CHEST: Mild asymmetric elevation of the right hemidiaphragm. Mild   dependent atelectasis.    LIVER: Mild focal fat deposition adjacent to the falciform ligament.  BILE DUCTS: Normal caliber.  GALLBLADDER: Decompressed  SPLEEN: Within normal limits.  PANCREAS: Within normal limits.  ADRENALS: Within normal limits.  KIDNEYS/URETERS: Within normal limits.    BLADDER: Within normal limits.  REPRODUCTIVE ORGANS: Uterus is rightward displacement by distended   bladder. Physiologic appearance of ovaries, right ovary contains a 1.6 cm   likely hemorrhagic cyst    BOWEL: No bowel obstruction. Appendix is normal.  PERITONEUM: Trace volume pelvic fluid, within physiologic limits.  VESSELS: Within normal limits.  RETROPERITONEUM/LYMPH NODES: No lymphadenopathy.  ABDOMINAL WALL: Improved body wall edema. Subcutaneous fat postprocedural   changes. No localized fluid collection  BONES: Within normal limits.    IMPRESSION:    Improved body wall edema. No subcutaneous fluid collection.    No acute process within the abdomen and pelvis.        TECHNIQUE: Duplex sonographyof the BILATERAL LOWER extremity veins with   color and spectral Doppler, with and without compression.(1/10)    FINDINGS:    RIGHT:  Normal compressibility of the RIGHT common femoral, femoral and popliteal   veins.  Doppler examination shows normal spontaneous and phasic flow.  No RIGHT calf vein thrombosis is detected.    LEFT:  Normal compressibility of the LEFT common femoral, femoral and popliteal   veins.  Doppler examination shows normal spontaneous and phasic flow.  No LEFT calf vein thrombosis is detected.    Superficial edema is present in the bilateral calves.    IMPRESSION:  No evidence of deep venous thrombosis in either lower extremity.

## 2022-01-28 NOTE — H&P ADULT - HISTORY OF PRESENT ILLNESS
26-year-old female with history of renal artery enlargement, herniated disc, recent plastic surgery 1/02/22, complicated by post-op infection s/p multiple courses of antibiotics as well as a prior admission (1/09-1/12), presenting with persistent fevers since her surgery, dizziness, and dry peeling skin over buttocks x1wk as well as new maculopapular rash on anterior distal thighs that started 3d ago, and is pruritic. She started using an aloe lotion however discontinued it due to increased pruritis with new vaginal itching and scant white discharge. She has been on multiple courses of cephalexin and was started on Bactrim Rx'ed 1/24 however this was discontinued due to onset of rash. She also reports subjective tachycardia, occasional left sided chest pain with deep breathing and lightheadedness, worse with standing and with poor PO intake associated with HA without vision changes. She is not on OCPs and has no hormone use, no history of DVT/PE. She has had no recent travel, no sick contacts. She reports that since her surgery she has been having ~daily fevers (Tm 100.7).      In the ED VS: 100.7  110-126  118-127/66-90  16-20  100%RA, received vancomycin 1g IVPB x1, fluconazole 150mg PO x1, and LR 2L IVF

## 2022-01-28 NOTE — H&P ADULT - NSHPPHYSICALEXAM_GEN_ALL_CORE
Vital Signs Last 24 Hrs  T(C): 37.2 (28 Jan 2022 05:05), Max: 38.2 (27 Jan 2022 22:21)  T(F): 99 (28 Jan 2022 05:05), Max: 100.7 (27 Jan 2022 22:21)  HR: 115 (28 Jan 2022 05:05) (110 - 126)  BP: 116/71 (28 Jan 2022 05:05) (116/71 - 127/90)  BP(mean): --  RR: 16 (28 Jan 2022 05:05) (16 - 20)  SpO2: 100% (28 Jan 2022 05:05) (100% - 100%)    PHYSICAL EXAM:  GENERAL: NAD, well-developed, well-nourished  HEAD:  Atraumatic, Normocephalic  EYES: PERRL, conjunctiva and sclera clear  NECK: Supple, No LAD  CHEST/LUNG: Clear to auscultation bilaterally; No wheezes, rales or rhonchi; normal work of breathing, speaking in full sentences  HEART: Regular rhythm, tachycardic; No murmurs, rubs, or gallops, (+)S1, S2  ABDOMEN: Soft, Nondistended; Normal Bowel sounds; (+)diffusely TTP  : external exam in presence of floor RNDanay, no ulcers or lesions appreciated; inguinal hypopigmentation noted  EXTREMITIES:  2+ Peripheral Pulses, No clubbing, cyanosis, or edema  PSYCH: normal mood and affect  NEUROLOGY: no focal neuro deficits  SKIN: dry flaking skin on buttocks, 3 well healing incisions on sacrum and b/l hip regions with surrounding skin induration, TTP; b/l anterior distal thigh scant maculopapular rash w/blanching erythema, no pustules noted; inguinal hypopigmentation

## 2022-01-29 ENCOUNTER — TRANSCRIPTION ENCOUNTER (OUTPATIENT)
Age: 27
End: 2022-01-29

## 2022-01-29 LAB
ALBUMIN SERPL ELPH-MCNC: 3.8 G/DL — SIGNIFICANT CHANGE UP (ref 3.3–5)
ALP SERPL-CCNC: 58 U/L — SIGNIFICANT CHANGE UP (ref 40–120)
ALT FLD-CCNC: 28 U/L — SIGNIFICANT CHANGE UP (ref 4–33)
ANION GAP SERPL CALC-SCNC: 9 MMOL/L — SIGNIFICANT CHANGE UP (ref 7–14)
AST SERPL-CCNC: 24 U/L — SIGNIFICANT CHANGE UP (ref 4–32)
BASOPHILS # BLD AUTO: 0.04 K/UL — SIGNIFICANT CHANGE UP (ref 0–0.2)
BASOPHILS NFR BLD AUTO: 0.9 % — SIGNIFICANT CHANGE UP (ref 0–2)
BILIRUB SERPL-MCNC: 0.3 MG/DL — SIGNIFICANT CHANGE UP (ref 0.2–1.2)
BUN SERPL-MCNC: 12 MG/DL — SIGNIFICANT CHANGE UP (ref 7–23)
CALCIUM SERPL-MCNC: 9.4 MG/DL — SIGNIFICANT CHANGE UP (ref 8.4–10.5)
CHLORIDE SERPL-SCNC: 102 MMOL/L — SIGNIFICANT CHANGE UP (ref 98–107)
CO2 SERPL-SCNC: 28 MMOL/L — SIGNIFICANT CHANGE UP (ref 22–31)
CREAT SERPL-MCNC: 0.65 MG/DL — SIGNIFICANT CHANGE UP (ref 0.5–1.3)
CULTURE RESULTS: SIGNIFICANT CHANGE UP
EOSINOPHIL # BLD AUTO: 0.19 K/UL — SIGNIFICANT CHANGE UP (ref 0–0.5)
EOSINOPHIL NFR BLD AUTO: 4.2 % — SIGNIFICANT CHANGE UP (ref 0–6)
GLUCOSE SERPL-MCNC: 80 MG/DL — SIGNIFICANT CHANGE UP (ref 70–99)
HCT VFR BLD CALC: 33.2 % — LOW (ref 34.5–45)
HGB BLD-MCNC: 10.9 G/DL — LOW (ref 11.5–15.5)
IANC: 1.16 K/UL — LOW (ref 1.5–8.5)
IMM GRANULOCYTES NFR BLD AUTO: 0.2 % — SIGNIFICANT CHANGE UP (ref 0–1.5)
LYMPHOCYTES # BLD AUTO: 2.56 K/UL — SIGNIFICANT CHANGE UP (ref 1–3.3)
LYMPHOCYTES # BLD AUTO: 56.9 % — HIGH (ref 13–44)
MAGNESIUM SERPL-MCNC: 1.9 MG/DL — SIGNIFICANT CHANGE UP (ref 1.6–2.6)
MCHC RBC-ENTMCNC: 30.1 PG — SIGNIFICANT CHANGE UP (ref 27–34)
MCHC RBC-ENTMCNC: 32.8 GM/DL — SIGNIFICANT CHANGE UP (ref 32–36)
MCV RBC AUTO: 91.7 FL — SIGNIFICANT CHANGE UP (ref 80–100)
MONOCYTES # BLD AUTO: 0.54 K/UL — SIGNIFICANT CHANGE UP (ref 0–0.9)
MONOCYTES NFR BLD AUTO: 12 % — SIGNIFICANT CHANGE UP (ref 2–14)
NEUTROPHILS # BLD AUTO: 1.16 K/UL — LOW (ref 1.8–7.4)
NEUTROPHILS NFR BLD AUTO: 25.8 % — LOW (ref 43–77)
NRBC # BLD: 0 /100 WBCS — SIGNIFICANT CHANGE UP
NRBC # FLD: 0 K/UL — SIGNIFICANT CHANGE UP
PHOSPHATE SERPL-MCNC: 4.9 MG/DL — HIGH (ref 2.5–4.5)
PLATELET # BLD AUTO: 239 K/UL — SIGNIFICANT CHANGE UP (ref 150–400)
POTASSIUM SERPL-MCNC: 4.2 MMOL/L — SIGNIFICANT CHANGE UP (ref 3.5–5.3)
POTASSIUM SERPL-SCNC: 4.2 MMOL/L — SIGNIFICANT CHANGE UP (ref 3.5–5.3)
PROT SERPL-MCNC: 7.5 G/DL — SIGNIFICANT CHANGE UP (ref 6–8.3)
RBC # BLD: 3.62 M/UL — LOW (ref 3.8–5.2)
RBC # FLD: 13.9 % — SIGNIFICANT CHANGE UP (ref 10.3–14.5)
SODIUM SERPL-SCNC: 139 MMOL/L — SIGNIFICANT CHANGE UP (ref 135–145)
SPECIMEN SOURCE: SIGNIFICANT CHANGE UP
WBC # BLD: 4.5 K/UL — SIGNIFICANT CHANGE UP (ref 3.8–10.5)
WBC # FLD AUTO: 4.5 K/UL — SIGNIFICANT CHANGE UP (ref 3.8–10.5)

## 2022-01-29 RX ORDER — PETROLATUM,WHITE
1 JELLY (GRAM) TOPICAL DAILY
Refills: 0 | Status: DISCONTINUED | OUTPATIENT
Start: 2022-01-29 | End: 2022-01-30

## 2022-01-29 RX ORDER — PETROLATUM,WHITE
1 JELLY (GRAM) TOPICAL
Qty: 0 | Refills: 0 | DISCHARGE
Start: 2022-01-29

## 2022-01-29 RX ORDER — HYDROCORTISONE 1 %
1 OINTMENT (GRAM) TOPICAL ONCE
Refills: 0 | Status: COMPLETED | OUTPATIENT
Start: 2022-01-29 | End: 2022-01-29

## 2022-01-29 RX ORDER — HYDROCORTISONE 1 %
1 OINTMENT (GRAM) TOPICAL ONCE
Refills: 0 | Status: COMPLETED | OUTPATIENT
Start: 2022-01-29 | End: 2022-01-30

## 2022-01-29 RX ORDER — ACETAMINOPHEN 500 MG
2 TABLET ORAL
Qty: 0 | Refills: 0 | DISCHARGE
Start: 2022-01-29

## 2022-01-29 RX ADMIN — Medication 1 APPLICATION(S): at 14:41

## 2022-01-29 RX ADMIN — ENOXAPARIN SODIUM 40 MILLIGRAM(S): 100 INJECTION SUBCUTANEOUS at 11:17

## 2022-01-29 RX ADMIN — Medication 1 APPLICATION(S): at 14:40

## 2022-01-29 NOTE — PROGRESS NOTE ADULT - SUBJECTIVE AND OBJECTIVE BOX
Patient is a 26y old  Female who presents with a chief complaint of persistent fevers; rash (2022 10:34)    Date of servie : 22 @ 14:40  INTERVAL HPI/OVERNIGHT EVENTS:  T(C): 36.7 (22 @ 05:25), Max: 36.9 (22 @ 17:51)  HR: 73 (22 @ 05:25) (73 - 106)  BP: 96/60 (22 @ 05:25) (96/60 - 110/67)  RR: 16 (22 @ 05:25) (16 - 20)  SpO2: 96% (22 @ 05:25) (96% - 100%)  Wt(kg): --  I&O's Summary    2022 07:01  -  2022 07:00  --------------------------------------------------------  IN: 1920 mL / OUT: 0 mL / NET: 1920 mL        LABS:                        10.9   4.50  )-----------( 239      ( 2022 06:37 )             33.2         139  |  102  |  12  ----------------------------<  80  4.2   |  28  |  0.65    Ca    9.4      2022 06:37  Phos  4.9       Mg     1.90         TPro  7.5  /  Alb  3.8  /  TBili  0.3  /  DBili  x   /  AST  24  /  ALT  28  /  AlkPhos  58      PT/INR - ( 2022 06:59 )   PT: 14.3 sec;   INR: 1.26 ratio         PTT - ( 2022 06:59 )  PTT:30.3 sec  Urinalysis Basic - ( 2022 23:32 )    Color: Colorless / Appearance: Clear / S.008 / pH: x  Gluc: x / Ketone: Negative  / Bili: Negative / Urobili: <2 mg/dL   Blood: x / Protein: Negative / Nitrite: Negative   Leuk Esterase: Negative / RBC: x / WBC 1 /HPF   Sq Epi: x / Non Sq Epi: 1 /HPF / Bacteria: Negative      CAPILLARY BLOOD GLUCOSE            Urinalysis Basic - ( 2022 23:32 )    Color: Colorless / Appearance: Clear / S.008 / pH: x  Gluc: x / Ketone: Negative  / Bili: Negative / Urobili: <2 mg/dL   Blood: x / Protein: Negative / Nitrite: Negative   Leuk Esterase: Negative / RBC: x / WBC 1 /HPF   Sq Epi: x / Non Sq Epi: 1 /HPF / Bacteria: Negative        MEDICATIONS  (STANDING):  AQUAPHOR (petrolatum Ointment) 1 Application(s) Topical daily  enoxaparin Injectable 40 milliGRAM(s) SubCutaneous every 24 hours  hydrocortisone 1% Cream 1 Application(s) Topical once  lactated ringers. 1000 milliLiter(s) (100 mL/Hr) IV Continuous <Continuous>    MEDICATIONS  (PRN):  acetaminophen     Tablet .. 650 milliGRAM(s) Oral every 6 hours PRN Temp greater or equal to 38.5C (101.3F), Mild Pain (1 - 3), Moderate Pain (4 - 6)          PHYSICAL EXAM:  GENERAL: NAD, well-groomed, well-developed  HEAD:  Atraumatic, Normocephalic  CHEST/LUNG: Clear to percussion bilaterally; No rales, rhonchi, wheezing, or rubs  HEART: Regular rate and rhythm; No murmurs, rubs, or gallops  ABDOMEN: Soft, Nontender, Nondistended; Bowel sounds present  EXTREMITIES:  2+ Peripheral Pulses, No clubbing, cyanosis, or edema  LYMPH: No lymphadenopathy noted  SKIN: No rashes or lesions    Care Discussed with Consultants/Other Providers [ ] YES  [ ] NO

## 2022-01-29 NOTE — DISCHARGE NOTE PROVIDER - CARE PROVIDER_API CALL
Fatou Rubin  Internal Medicine  [address] [place] [address]  Phone: [phone]  Fax: ([phone]  Follow Up Time:

## 2022-01-29 NOTE — DISCHARGE NOTE PROVIDER - HOSPITAL COURSE
26-year-old female with history of renal artery enlargement, herniated disc, recent plastic surgery 1/02/22, complicated by post-op infection s/p multiple courses of antibiotics as well as a prior admission (1/09-1/12), presenting with persistent fevers since her surgery, dizziness, and dry peeling skin over buttocks x1wk as well as new maculopapular rash on anterior distal thighs.     Fever  unclear etiology, no leukocytosis  minimally elevated ESR, CRP negative, procalcitonin 0.12, CXR negative  blood culture x 2 ngtd, urine cx negative   s/p IV Vanco x 1 in ED  CTAP: Improved body wall edema. No subcutaneous fluid collection. No acute process within the abdomen and pelvis.  ID consulted - fevers likely 2/2 post-op fat necrosis and/or abx reaction given presence of rash - discontinue abx, referral to allergist as outpt, GYN eval outpt if vaginal itching/ rash persists   resolved     Tachycardia  TSH wnl  ddimer [id number], b/l LE doppler neg for DVT, recent CTA chest neg for PE on 1/23/22  ID consulted - no signs of infection   resolved     Vaginal discharge  s/p fluconazole in ED  f/u GYN outpt if sx persists    Rash/skin eruption  ID consulted - may be 2/2 abx reaction   continue to hold trimethoprim/sulfamethoxazole.  f/u derm outpt, pt has appt for next week  referral to allergist upon dc     Dizziness  s/p IVF  fall precautions  orthostatic negative   resolved    On ___ this case was reviewed with Dr. Bishop. The patient is medically stable and optimized for discharge. All medications were reviewed and prescriptions were sent to mutually agreed upon pharmacy.   26-year-old female with history of renal artery enlargement, herniated disc, recent plastic surgery 1/02/22, complicated by post-op infection s/p multiple courses of antibiotics as well as a prior admission (1/09-1/12), presenting with persistent fevers since her surgery, dizziness, and dry peeling skin over buttocks x1wk as well as new maculopapular rash on anterior distal thighs.     Fever  unclear etiology, no leukocytosis  minimally elevated ESR, CRP negative, procalcitonin 0.12, CXR negative  blood culture x 2 ngtd, urine cx negative   s/p IV Vanco x 1 in ED  CTAP: Improved body wall edema. No subcutaneous fluid collection. No acute process within the abdomen and pelvis.  ID consulted - fevers likely 2/2 post-op fat necrosis and/or abx reaction given presence of rash - discontinue abx, referral to allergist as outpt, GYN eval outpt if vaginal itching/ rash persists   resolved     Tachycardia  TSH wnl  ddimer [id number], b/l LE doppler neg for DVT, recent CTA chest neg for PE on 1/23/22  ID consulted - no signs of infection   resolved     Vaginal discharge  s/p fluconazole in ED  f/u GYN outpt if sx persists    Rash/skin eruption  ID consulted - may be 2/2 abx reaction   continue to hold trimethoprim/sulfamethoxazole.  f/u derm outpt, pt has appt scheduled for tomorrow, 1/31  referral to allergist upon dc     Dizziness  s/p IVF  fall precautions  orthostatic negative     On 1/30/22 this case was reviewed with Dr. Bishop. The patient is medically stable and optimized for discharge.

## 2022-01-29 NOTE — DISCHARGE NOTE PROVIDER - NSFOLLOWUPCLINICS_GEN_ALL_ED_FT
[facility]  Allergy  [address] [place] [address]  Phone: [phone]  Fax:

## 2022-01-29 NOTE — DISCHARGE NOTE PROVIDER - NSDCCPCAREPLAN_GEN_ALL_CORE_FT
PRINCIPAL DISCHARGE DIAGNOSIS  Diagnosis: Fever of unknown origin  Assessment and Plan of Treatment: You were seen by infectious disease for fevers which is likely secondary to post-operative skin changes and/or antibiotic reaction given presence of rash. Antibiotics were discontinued. Your blood and urine were tested for bacterial infection which were both negative. You had a CT scan of the abdomen which showed no acute process. Your fevers have resolved. You should follow up with an allergist outpatient and avoid antibiotic therapy until following up with an allergist.         SECONDARY DISCHARGE DIAGNOSES  Diagnosis: Tachycardia  Assessment and Plan of Treatment: You were admitted to [facility] with an elevated heart rate which has now resolved. Your thyroid function was tested which is normal. You had a recent CT angiogram of the chest which was negative for blood clot in the lung on 1/23/22. You had ultrasound of your legs which showed no blood clot in the legs. You do not show any signs of active infection.   Follow up with your PCP.       Diagnosis: Candidiasis of vagina  Assessment and Plan of Treatment: You were given one dose of fluconazole in the ED for vaginitis with improvement in symptoms. You should follow up with GYN outpatient if your symptoms persist. You can use over the counter Vagisil for symptomatic relief.       Diagnosis: Dermatitis  Assessment and Plan of Treatment: You were admitted to [facility] with a rash which has now improved. You were seen by Infectious Disease and should follow up with an allergist upon discharge. You should avoid antibibiotic therapy, specifically Bactrim (sulfa drugs as well as penicillins and cephalosporins), until you see an allergist.          PRINCIPAL DISCHARGE DIAGNOSIS  Diagnosis: Fever of unknown origin  Assessment and Plan of Treatment: You were seen by infectious disease for fevers which is likely secondary to post-operative skin changes and/or antibiotic reaction given presence of rash. Antibiotics were discontinued. Your blood and urine were tested for bacterial infection which were both negative. You had a CT scan of the abdomen which showed no acute process. Your fevers have resolved. You should follow up with an allergist outpatient and should avoid antibibiotic therapy, specifically Bactrim (sulfa drugs as well as penicillins and cephalosporins), until you see an allergist.          SECONDARY DISCHARGE DIAGNOSES  Diagnosis: Tachycardia  Assessment and Plan of Treatment: You were admitted to [facility] with an elevated heart rate which has now resolved. Your thyroid function was tested which is normal. You had a recent CT angiogram of the chest which was negative for blood clot in the lung on 1/23/22. You had ultrasound of your legs which showed no blood clot in the legs. You do not show any signs of active infection.   Follow up with your PCP.       Diagnosis: Candidiasis of vagina  Assessment and Plan of Treatment: You were given one dose of fluconazole in the ED for vaginitis with improvement in symptoms. You should follow up with GYN outpatient if your symptoms persist. You can use over the counter Vagisil for symptomatic relief.       Diagnosis: Dermatitis  Assessment and Plan of Treatment: You were admitted to [facility] with a rash which has now improved. You were seen by Infectious Disease and should follow up with an allergist upon discharge. You should avoid antibibiotic therapy, specifically Bactrim (sulfa drugs as well as penicillins and cephalosporins), until you see an allergist.

## 2022-01-29 NOTE — DISCHARGE NOTE PROVIDER - NSDCMRMEDTOKEN_GEN_ALL_CORE_FT
acetaminophen 325 mg oral tablet: 2 tab(s) orally every 6 hours, As needed, Temp greater or equal to 38.5C (101.3F), Mild Pain (1 - 3), Moderate Pain (4 - 6)  petrolatum topical ointment: 1 application topically once a day

## 2022-01-29 NOTE — DISCHARGE NOTE PROVIDER - NSDCFUADDAPPT_GEN_ALL_CORE_FT
Follow up with your primary care physician for further monitoring in 1-2 weeks. Please call to arrange appointment.     Follow up with your GYN upon discharge.     Follow up with allergist at [facility] clinic on [address] [place] [address] upon discharge. Please call [phone] to arrange an appointment.  Follow up with your primary care physician for further monitoring in 1-2 weeks. Please call to arrange appointment.     Follow up with your GYN upon discharge.     Follow up with your dermatologist upon discharge.    Follow up with allergist at [facility] clinic on [address] [place] [address] upon discharge. Please call [phone] to arrange an appointment.

## 2022-01-29 NOTE — PROGRESS NOTE ADULT - ASSESSMENT
26-year-old female with history of renal artery enlargement, herniated disc, recent plastic surgery 1/02/22, complicated by post-op infection s/p multiple courses of antibiotics as well as a prior admission (1/09-1/12), presenting with persistent fevers since her surgery, dizziness, and dry peeling skin over buttocks x1wk as well as new maculopapular rash on anterior distal thighs    Problem/Plan - 1:  ·  Problem: Persistent fever.   ·  Plan: unclear etiology, cannot r/o infection however no leukocytosis, CRP wnl  minimally elevated ESR  ID fu       Problem/Plan - 2:  ·  Problem: Tachycardia.   ·  Plan: TSH wnl  given tachycardia and recent op, check d-dimer  possibly infectious origin  continue to monitor.    Problem/Plan - 3:  ·  Problem: Vaginal discharge.   ·  Plan: s/p fluconazole in ED   gyn consult pending     Problem/Plan - 4:  ·  Problem: Rash/skin eruption.   ·  Plan: may benefit from derm eval, has outpatient derm appointment scheduled for next week however  continue to monitor  f/u ID recs  continue to hold trimethoprim/sulfamethoxazole.    Problem/Plan - 5:  ·  Problem: Transaminitis.   ·  Plan: monitor/trend  unclear etiology.    Problem/Plan - 6:  ·  Problem: Dizziness.   ·  Plan: fall precautions  IV hydration  check orthostatic BP in AM  f/u d-dimer.    Problem/Plan - 7:  ·  Problem: Need for prophylactic measure.   ·  Plan: enoxaparin for DVT ppx

## 2022-01-30 ENCOUNTER — TRANSCRIPTION ENCOUNTER (OUTPATIENT)
Age: 27
End: 2022-01-30

## 2022-01-30 VITALS
DIASTOLIC BLOOD PRESSURE: 72 MMHG | SYSTOLIC BLOOD PRESSURE: 103 MMHG | TEMPERATURE: 98 F | HEART RATE: 88 BPM | RESPIRATION RATE: 17 BRPM | OXYGEN SATURATION: 100 %

## 2022-01-30 LAB
ALBUMIN SERPL ELPH-MCNC: 4.2 G/DL — SIGNIFICANT CHANGE UP (ref 3.3–5)
ALP SERPL-CCNC: 63 U/L — SIGNIFICANT CHANGE UP (ref 40–120)
ALT FLD-CCNC: 28 U/L — SIGNIFICANT CHANGE UP (ref 4–33)
ANION GAP SERPL CALC-SCNC: 11 MMOL/L — SIGNIFICANT CHANGE UP (ref 7–14)
AST SERPL-CCNC: 26 U/L — SIGNIFICANT CHANGE UP (ref 4–32)
BASOPHILS # BLD AUTO: 0.04 K/UL — SIGNIFICANT CHANGE UP (ref 0–0.2)
BASOPHILS NFR BLD AUTO: 0.7 % — SIGNIFICANT CHANGE UP (ref 0–2)
BILIRUB SERPL-MCNC: 0.3 MG/DL — SIGNIFICANT CHANGE UP (ref 0.2–1.2)
BUN SERPL-MCNC: 16 MG/DL — SIGNIFICANT CHANGE UP (ref 7–23)
CALCIUM SERPL-MCNC: 9.7 MG/DL — SIGNIFICANT CHANGE UP (ref 8.4–10.5)
CHLORIDE SERPL-SCNC: 101 MMOL/L — SIGNIFICANT CHANGE UP (ref 98–107)
CO2 SERPL-SCNC: 26 MMOL/L — SIGNIFICANT CHANGE UP (ref 22–31)
CREAT SERPL-MCNC: 0.66 MG/DL — SIGNIFICANT CHANGE UP (ref 0.5–1.3)
EOSINOPHIL # BLD AUTO: 0.34 K/UL — SIGNIFICANT CHANGE UP (ref 0–0.5)
EOSINOPHIL NFR BLD AUTO: 5.8 % — SIGNIFICANT CHANGE UP (ref 0–6)
GLUCOSE SERPL-MCNC: 81 MG/DL — SIGNIFICANT CHANGE UP (ref 70–99)
HCT VFR BLD CALC: 35.4 % — SIGNIFICANT CHANGE UP (ref 34.5–45)
HGB BLD-MCNC: 11.9 G/DL — SIGNIFICANT CHANGE UP (ref 11.5–15.5)
IANC: 2.54 K/UL — SIGNIFICANT CHANGE UP (ref 1.5–8.5)
IMM GRANULOCYTES NFR BLD AUTO: 0.3 % — SIGNIFICANT CHANGE UP (ref 0–1.5)
LYMPHOCYTES # BLD AUTO: 2.39 K/UL — SIGNIFICANT CHANGE UP (ref 1–3.3)
LYMPHOCYTES # BLD AUTO: 40.7 % — SIGNIFICANT CHANGE UP (ref 13–44)
MAGNESIUM SERPL-MCNC: 1.9 MG/DL — SIGNIFICANT CHANGE UP (ref 1.6–2.6)
MCHC RBC-ENTMCNC: 30.6 PG — SIGNIFICANT CHANGE UP (ref 27–34)
MCHC RBC-ENTMCNC: 33.6 GM/DL — SIGNIFICANT CHANGE UP (ref 32–36)
MCV RBC AUTO: 91 FL — SIGNIFICANT CHANGE UP (ref 80–100)
MONOCYTES # BLD AUTO: 0.54 K/UL — SIGNIFICANT CHANGE UP (ref 0–0.9)
MONOCYTES NFR BLD AUTO: 9.2 % — SIGNIFICANT CHANGE UP (ref 2–14)
NEUTROPHILS # BLD AUTO: 2.54 K/UL — SIGNIFICANT CHANGE UP (ref 1.8–7.4)
NEUTROPHILS NFR BLD AUTO: 43.3 % — SIGNIFICANT CHANGE UP (ref 43–77)
NRBC # BLD: 0 /100 WBCS — SIGNIFICANT CHANGE UP
NRBC # FLD: 0 K/UL — SIGNIFICANT CHANGE UP
PHOSPHATE SERPL-MCNC: 4.5 MG/DL — SIGNIFICANT CHANGE UP (ref 2.5–4.5)
PLATELET # BLD AUTO: 303 K/UL — SIGNIFICANT CHANGE UP (ref 150–400)
POTASSIUM SERPL-MCNC: 4.2 MMOL/L — SIGNIFICANT CHANGE UP (ref 3.5–5.3)
POTASSIUM SERPL-SCNC: 4.2 MMOL/L — SIGNIFICANT CHANGE UP (ref 3.5–5.3)
PROT SERPL-MCNC: 8.3 G/DL — SIGNIFICANT CHANGE UP (ref 6–8.3)
RBC # BLD: 3.89 M/UL — SIGNIFICANT CHANGE UP (ref 3.8–5.2)
RBC # FLD: 13.9 % — SIGNIFICANT CHANGE UP (ref 10.3–14.5)
SODIUM SERPL-SCNC: 138 MMOL/L — SIGNIFICANT CHANGE UP (ref 135–145)
WBC # BLD: 5.87 K/UL — SIGNIFICANT CHANGE UP (ref 3.8–10.5)
WBC # FLD AUTO: 5.87 K/UL — SIGNIFICANT CHANGE UP (ref 3.8–10.5)

## 2022-01-30 RX ADMIN — Medication 1 APPLICATION(S): at 12:13

## 2022-01-30 RX ADMIN — ENOXAPARIN SODIUM 40 MILLIGRAM(S): 100 INJECTION SUBCUTANEOUS at 12:20

## 2022-01-30 RX ADMIN — Medication 1 APPLICATION(S): at 05:19

## 2022-01-30 NOTE — DISCHARGE NOTE NURSING/CASE MANAGEMENT/SOCIAL WORK - NSDCFUADDAPPT_GEN_ALL_CORE_FT
Follow up with your primary care physician for further monitoring in 1-2 weeks. Please call to arrange appointment.     Follow up with your GYN upon discharge.     Follow up with your dermatologist upon discharge.    Follow up with allergist at [facility] clinic on [address] [place] [address] upon discharge. Please call [phone] to arrange an appointment.

## 2022-01-30 NOTE — DISCHARGE NOTE NURSING/CASE MANAGEMENT/SOCIAL WORK - NSDCPEFALRISK_GEN_ALL_CORE
For information on Fall & Injury Prevention, visit: [email or web address] OR  [email or web address] OR  https://www.cdc.gov/steadi/patient.html

## 2022-01-30 NOTE — DISCHARGE NOTE NURSING/CASE MANAGEMENT/SOCIAL WORK - PATIENT PORTAL LINK FT
You can access the FollowMyHealth Patient Portal offered by [facility] by registering at the following website: [email or web address]. By joining [employer]’s FollowMyHealth portal, you will also be able to view your health information using other applications (apps) compatible with our system.

## 2022-02-02 LAB
CULTURE RESULTS: SIGNIFICANT CHANGE UP
CULTURE RESULTS: SIGNIFICANT CHANGE UP
SPECIMEN SOURCE: SIGNIFICANT CHANGE UP
SPECIMEN SOURCE: SIGNIFICANT CHANGE UP

## 2022-02-03 ENCOUNTER — EMERGENCY (EMERGENCY)
Facility: HOSPITAL | Age: 27
LOS: 1 days | Discharge: HOME | End: 2022-02-03
Attending: EMERGENCY MEDICINE | Admitting: EMERGENCY MEDICINE
Payer: MEDICAID

## 2022-02-03 VITALS
TEMPERATURE: 97 F | HEART RATE: 92 BPM | DIASTOLIC BLOOD PRESSURE: 99 MMHG | HEIGHT: 61 IN | SYSTOLIC BLOOD PRESSURE: 133 MMHG | OXYGEN SATURATION: 100 % | RESPIRATION RATE: 18 BRPM

## 2022-02-03 DIAGNOSIS — Z98.890 OTHER SPECIFIED POSTPROCEDURAL STATES: Chronic | ICD-10-CM

## 2022-02-03 PROCEDURE — 99283 EMERGENCY DEPT VISIT LOW MDM: CPT

## 2022-02-03 RX ORDER — ACETAMINOPHEN 500 MG
975 TABLET ORAL ONCE
Refills: 0 | Status: COMPLETED | OUTPATIENT
Start: 2022-02-03 | End: 2022-02-03

## 2022-02-03 RX ORDER — ACETAMINOPHEN 500 MG
650 TABLET ORAL ONCE
Refills: 0 | Status: DISCONTINUED | OUTPATIENT
Start: 2022-02-03 | End: 2022-02-06

## 2022-02-03 RX ADMIN — Medication 975 MILLIGRAM(S): at 08:00

## 2022-02-03 NOTE — ED PROVIDER NOTE - PHYSICAL EXAMINATION
Const: Well-nourished, Well-developed, appearing stated age.  Eyes: no conjunctival injection, and symmetrical lids.  HEENT: Head NCAT, no lesions. Atraumatic external nose and ears. Moist MM.  Neck: Symmetric, trachea midline.   RESP: Unlabored respiratory effort.   GI: NO ttp,  non distended, minimal swelling.  MSK: Normocephalic/Atraumatic, Lower Extremities w/o calf tenderness or edema b/l.   Skin: Warm, dry and intact. post surgical site healing well. cellulitic infection on buttocks recolved.  Neuro: CNs II-XII grossly intact. Motor & Sensation grossly intact.  Psych: Awake, Alert, & Oriented (AAO) x3. Appropriate mood and affect.

## 2022-02-03 NOTE — ED ADULT TRIAGE NOTE - CHIEF COMPLAINT QUOTE
pt c/o lower abdominal and lower back pain. Of note BBL 1/2 with post op infection buttock infection, was treated with abx. Denies diarrhea, constipation, fevers, urinary symptoms. LMP in December

## 2022-02-03 NOTE — ED PROVIDER NOTE - CLINICAL SUMMARY MEDICAL DECISION MAKING FREE TEXT BOX
Pt is a healthy 27 yo f who presents with post surgical abdominal discomfort. Educate pt on post-surgery complications. pain control.

## 2022-02-03 NOTE — ED PROVIDER NOTE - IV ALTEPLASE INCLUSION HIDDEN
show
Instructed patient/caregiver to follow-up with primary care physician./Elevate the injured extremity as instructed./Keep the cast/splint/dressing clean and dry./Instructed patient/caregiver regarding signs and symptoms of infection./Verbal/written post procedure instructions were given to patient/caregiver.

## 2022-02-03 NOTE — ED PROVIDER NOTE - PATIENT PORTAL LINK FT
You can access the FollowMyHealth Patient Portal offered by [facility] by registering at the following website: [email or web address]. By joining [employer]’s FollowMyHealth portal, you will also be able to view your health information using other applications (apps) compatible with our system.

## 2022-02-03 NOTE — ED PROVIDER NOTE - OBJECTIVE STATEMENT
Pt is a 27 yo f with multiple ED visits for post BBL and full abdominal liposuction surgery complications including cellulitis 3 weeks ago that is now resolved presents to ED for abdominal swelling and general abdominal pain with movement only, 6/10, pain mostly with lying down and then getting up with no n/v/f/c, diarrhea, dysuria.  Pt states she is not aware of post surgical course. Pt has follow up visit with her surgeon and has an appointment with an allergist coming up. No rashes.

## 2022-02-03 NOTE — ED PROVIDER NOTE - ATTENDING CONTRIBUTION TO CARE
Dr. Villegas: 25 yo female with no sig PMH, 1 month s/p abdominoplasty and fat transfer ("Brazilian butt lift") by surgeon in [place], s/p admission for cellulitis and multiple ED visits and CTs for post-surgical abdominal pain (most recent CT ~1 week ago showing no acute process, improved abdominal wall edema, no collection), in ED with complaint of abdiel-umbilical swelling and discomfort when she moves.  No fever, CP/SOB, N/V/D or dysuria.  Has not taken anything for pain.  On exam pt well appearing, in NAD, heart RRR, lungs CTAB, abd NTND and without appreciable swelling on my evaluation, BS+, extremities without swelling, strength 5/5 in all extremities and skin without rash.  Mild low back paraspinal muscle TTP.  Midline cervical/thoracic/lumbosacral spine without bony TTP or step off.  No cellulitis noted to abdominal wall or back.  Pt is extremely well appearing, in NAD, and does not appear to have an acute intra-abdominal process.  Discussed with pt that she is likely experiencing normal post-surgical changes, and I do not want to irradiate pt as suspicion for acute process, including infection, is extremely low.  Recommended acetaminophen for pain, and pt understands this.  She has an appointment with her surgeon in "1-2 months" and I advised her to keep this appointment. Dr. Villegas: 27 yo female with no sig PMH, 1 month s/p abdominoplasty and fat transfer ("Brazilian butt lift") by surgeon in [place], s/p admission for cellulitis and multiple ED visits and CTs for post-surgical abdominal pain (most recent CT ~1 week ago showing no acute process, improved abdominal wall edema, no collection), in ED with complaint of abdiel-umbilical swelling and discomfort when she moves.  No fever, CP/SOB, N/V/D or dysuria.  Has not taken anything for pain.  On exam pt well appearing, in NAD, heart RRR, lungs CTAB, abd NTND and without appreciable swelling on my evaluation, BS+, extremities without swelling, strength 5/5 in all extremities and skin without rash.  Mild low back paraspinal muscle TTP.  Midline cervical/thoracic/lumbosacral spine without bony TTP or step off.  No cellulitis noted to abdominal wall or back.  Pt is extremely well appearing, in NAD, and does not appear to have an acute intra-abdominal process.  Discussed with pt that she is likely experiencing normal post-surgical changes, and I do not want to irradiate pt as suspicion for acute process, including infection, is extremely low.  At this time there is no indication for further ED workup.  Recommended acetaminophen for pain, and pt understands this.  She has an appointment with her surgeon in "1-2 months" and I advised her to keep this appointment.

## 2022-02-03 NOTE — ED PROVIDER NOTE - PROGRESS NOTE DETAILS
Henry Torre, PGY1 Spent 20 mins discussing post surgical course for pt so she is aware of what to expect. Pt continues to feel well during exam. Pt feels comfortable going home with return precautions.

## 2022-02-09 ENCOUNTER — TRANSCRIPTION ENCOUNTER (OUTPATIENT)
Age: 27
End: 2022-02-09

## 2022-02-09 ENCOUNTER — OUTPATIENT (OUTPATIENT)
Dept: OUTPATIENT SERVICES | Facility: HOSPITAL | Age: 27
LOS: 1 days | End: 2022-02-09
Payer: MEDICAID

## 2022-02-09 ENCOUNTER — APPOINTMENT (OUTPATIENT)
Dept: INTERNAL MEDICINE | Facility: CLINIC | Age: 27
End: 2022-02-09
Payer: MEDICAID

## 2022-02-09 VITALS
BODY MASS INDEX: 21.29 KG/M2 | OXYGEN SATURATION: 99 % | HEART RATE: 102 BPM | WEIGHT: 114.25 LBS | DIASTOLIC BLOOD PRESSURE: 64 MMHG | SYSTOLIC BLOOD PRESSURE: 102 MMHG | HEIGHT: 61.3 IN

## 2022-02-09 DIAGNOSIS — I10 ESSENTIAL (PRIMARY) HYPERTENSION: ICD-10-CM

## 2022-02-09 DIAGNOSIS — G62.9 POLYNEUROPATHY, UNSPECIFIED: ICD-10-CM

## 2022-02-09 DIAGNOSIS — Z00.00 ENCOUNTER FOR GENERAL ADULT MEDICAL EXAMINATION W/OUT ABNORMAL FINDINGS: ICD-10-CM

## 2022-02-09 DIAGNOSIS — Z98.890 OTHER SPECIFIED POSTPROCEDURAL STATES: Chronic | ICD-10-CM

## 2022-02-09 PROCEDURE — G0463: CPT

## 2022-02-09 PROCEDURE — 84443 ASSAY THYROID STIM HORMONE: CPT

## 2022-02-09 PROCEDURE — 80053 COMPREHEN METABOLIC PANEL: CPT

## 2022-02-09 PROCEDURE — 85025 COMPLETE CBC W/AUTO DIFF WBC: CPT

## 2022-02-09 PROCEDURE — 99204 OFFICE O/P NEW MOD 45 MIN: CPT | Mod: GC

## 2022-02-09 RX ORDER — GABAPENTIN 100 MG/1
100 CAPSULE ORAL
Qty: 180 | Refills: 0 | Status: ACTIVE | COMMUNITY
Start: 2022-02-09 | End: 1900-01-01

## 2022-02-10 PROBLEM — G62.9 NEUROPATHY: Status: ACTIVE | Noted: 2022-02-09

## 2022-02-10 PROBLEM — Z00.00 ENCOUNTER FOR PREVENTIVE HEALTH EXAMINATION: Status: ACTIVE | Noted: 2022-01-19

## 2022-02-10 NOTE — REVIEW OF SYSTEMS
[Abdominal Pain] : abdominal pain [Dysuria] : dysuria [Itching] : Itching [Fever] : no fever [Chills] : no chills [Vision Problems] : no vision problems [Hearing Loss] : no hearing loss [Chest Pain] : no chest pain [Palpitations] : no palpitations [Lower Ext Edema] : no lower extremity edema [Shortness Of Breath] : no shortness of breath [Wheezing] : no wheezing [Cough] : no cough [Dyspnea on Exertion] : no dyspnea on exertion [Nausea] : no nausea [Constipation] : no constipation [Diarrhea] : diarrhea [Vomiting] : no vomiting [Heartburn] : no heartburn [Melena] : no melena [Hematuria] : no hematuria [Vaginal Discharge] : no vaginal discharge [Joint Pain] : no joint pain [Nail Changes] : no nail changes [Dizziness] : no dizziness [Fainting] : no fainting [Unsteady Walking] : no ataxia [Suicidal] : not suicidal [FreeTextEntry9] : handds and ankles [de-identified] : dry skins
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
## 2022-02-10 NOTE — PHYSICAL EXAM
[No Acute Distress] : no acute distress [Well Nourished] : well nourished [Well Developed] : well developed [Well-Appearing] : well-appearing [EOMI] : extraocular movements intact [No Lymphadenopathy] : no lymphadenopathy [Supple] : supple [No Respiratory Distress] : no respiratory distress  [No Accessory Muscle Use] : no accessory muscle use [Clear to Auscultation] : lungs were clear to auscultation bilaterally [Regular Rhythm] : with a regular rhythm [Normal S1, S2] : normal S1 and S2 [No Murmur] : no murmur heard [No Edema] : there was no peripheral edema [No Extremity Clubbing/Cyanosis] : no extremity clubbing/cyanosis [Soft] : abdomen soft [Non-distended] : non-distended [No Masses] : no abdominal mass palpated [Normal Supraclavicular Nodes] : no supraclavicular lymphadenopathy [Normal Posterior Cervical Nodes] : no posterior cervical lymphadenopathy [Normal Anterior Cervical Nodes] : no anterior cervical lymphadenopathy [No CVA Tenderness] : no CVA  tenderness [Speech Grossly Normal] : speech grossly normal [Normal Affect] : the affect was normal [de-identified] : Sclera clear [de-identified] : borderline tachycardia [de-identified] : tender to light palpation in all qunadrants but predominantly in lower badominal near incision site from prior abdiminoplasty and in lower back  [de-identified] : l

## 2022-02-10 NOTE — HISTORY OF PRESENT ILLNESS
[FreeTextEntry1] : New patient with chief concern of abdominal pain [de-identified] : 26 y female hx herniated discs, UTI, cellulitis, and a recent abdominoplasty for fatty tissue for Brazilian Butt Lift presenting as a new patient to establish care and for abdominal pain. She reports presence of worsening, constant abdominal pain 8/10 that increased from 6/10. Patient describes her abdominal pain as circumferential with shocking in character. She currently reports absence of fevers, chills, headaches, dizzy, coughing, chest pain, no diarrhea, melena, hematochezia, constipation, hematuria, burning sensation with urination, or LE edema. However, she has dysuria after removal of a 3-day Jeong back in mid January after an episode of urinary retention and REGINALD. Patient takes 2 tabs of Tylenol 350mg for abdominal pain. Additionally, she notes dry skin and col sensitivities. Her diet comprises of salmon, broccoli, chicken, rice, fruit, etc Hx of brief hookah use and EtOH on holidays. She works for security at an airport. Not sexually active. Allergic to shrimp. Takes no other medications besides Advil for pain. Has a history of surgical cyst removal from eyelids ~3 yrs ago. COVID Pfizer 2-dose series (7/14/21) and (8/9/21). Of note, her menstrual cycles are irregular and she thinks it may be stress related. She feels down about her post surgical complications.\par

## 2022-02-10 NOTE — ASSESSMENT
[FreeTextEntry1] : 26 y.o female hx abdominoplasty w. fatty tissue transfer from abdomen for butt lift on 1/2/21 presenting w. persistent and worsening circumferential abdominal pain w. a shocking character likely neuropathy from fine disturbance fine sensory nerves in subcutaneous tissue after abdominal fat tissue removal and transfer.\par \par #abdominal subcutaneous tissue neuropathy\par -started patient on gabapentin 100mg once qhs x2day; then 200mg once ghs x2days; then 300mg once at qhs thereafter with instructions to increase to 300mg BID or even TID if patient needs more pain control\par \par #HCM\par -CBC, CMP, \par -TSH w. T4 as patient has irregular menstrual cycles and reported cold sensitivity and dry skin  \par -Patient has an appointment with Gyn\par -Return within 1 year or earlier\par \par Discussed case and management with Dr. Scott Fitzgerald

## 2022-02-11 LAB
ALBUMIN SERPL ELPH-MCNC: 4.6 G/DL
ALP BLD-CCNC: 58 U/L
ALT SERPL-CCNC: 28 U/L
ANION GAP SERPL CALC-SCNC: 12 MMOL/L
AST SERPL-CCNC: 26 U/L
BASOPHILS # BLD AUTO: 0.05 K/UL
BASOPHILS NFR BLD AUTO: 0.6 %
BILIRUB SERPL-MCNC: 0.5 MG/DL
BUN SERPL-MCNC: 16 MG/DL
CALCIUM SERPL-MCNC: 9.7 MG/DL
CHLORIDE SERPL-SCNC: 101 MMOL/L
CO2 SERPL-SCNC: 25 MMOL/L
CREAT SERPL-MCNC: 0.77 MG/DL
EOSINOPHIL # BLD AUTO: 0.09 K/UL
EOSINOPHIL NFR BLD AUTO: 1.2 %
GLUCOSE SERPL-MCNC: 78 MG/DL
HCT VFR BLD CALC: 38.1 %
HGB BLD-MCNC: 12 G/DL
IMM GRANULOCYTES NFR BLD AUTO: 0.3 %
LYMPHOCYTES # BLD AUTO: 2.41 K/UL
LYMPHOCYTES NFR BLD AUTO: 31.2 %
MAN DIFF?: NORMAL
MCHC RBC-ENTMCNC: 30 PG
MCHC RBC-ENTMCNC: 31.5 GM/DL
MCV RBC AUTO: 95.3 FL
MONOCYTES # BLD AUTO: 0.87 K/UL
MONOCYTES NFR BLD AUTO: 11.3 %
NEUTROPHILS # BLD AUTO: 4.29 K/UL
NEUTROPHILS NFR BLD AUTO: 55.4 %
PLATELET # BLD AUTO: 359 K/UL
POTASSIUM SERPL-SCNC: 4.6 MMOL/L
PROT SERPL-MCNC: 7.9 G/DL
RBC # BLD: 4 M/UL
RBC # FLD: 14.4 %
SODIUM SERPL-SCNC: 138 MMOL/L
TSH SERPL-ACNC: 0.99 UIU/ML
WBC # FLD AUTO: 7.73 K/UL

## 2022-02-17 DIAGNOSIS — G62.9 POLYNEUROPATHY, UNSPECIFIED: ICD-10-CM

## 2022-03-20 ENCOUNTER — EMERGENCY (EMERGENCY)
Facility: HOSPITAL | Age: 27
LOS: 1 days | Discharge: HOME | End: 2022-03-20
Admitting: EMERGENCY MEDICINE
Payer: MEDICAID

## 2022-03-20 VITALS
HEIGHT: 61 IN | OXYGEN SATURATION: 100 % | TEMPERATURE: 98 F | RESPIRATION RATE: 18 BRPM | SYSTOLIC BLOOD PRESSURE: 135 MMHG | HEART RATE: 94 BPM | DIASTOLIC BLOOD PRESSURE: 86 MMHG

## 2022-03-20 DIAGNOSIS — Z98.890 OTHER SPECIFIED POSTPROCEDURAL STATES: Chronic | ICD-10-CM

## 2022-03-20 PROCEDURE — 99284 EMERGENCY DEPT VISIT MOD MDM: CPT

## 2022-03-20 NOTE — ED PROVIDER NOTE - OBJECTIVE STATEMENT
26y Female complaining of urinary symptoms. C/o dysuria and hematuria since today with chills. reports hx of uti, no h/o kidney stones. Mild lower abd pain. took azo today which helped with pain. denies fevers, nausea, vomiting. sexual active with 1 partner. no hx of stis.

## 2022-03-20 NOTE — ED ADULT NURSE NOTE - OBJECTIVE STATEMENT
patient aaox3. ambulatory. came in with dysuria and hematuria since today with chills. reports hx of uti, no stones. endorses bilateral lower quadrant pain. tender to palpation. took azo today which helped with pain. now 5/10 pain. denies fevers, nausea, vomiting. sexual active with 1 partner. no hx of stis. no medical problems or on medications. Will continue to monitor

## 2022-03-20 NOTE — ED PROVIDER NOTE - PATIENT PORTAL LINK FT
You can access the FollowMyHealth Patient Portal offered by [facility] by registering at the following website: [email or web address]. By joining [employer]’s FollowMyHealth portal, you will also be able to view your health information using other applications (apps) compatible with our system.

## 2022-03-21 VITALS
OXYGEN SATURATION: 100 % | HEART RATE: 77 BPM | DIASTOLIC BLOOD PRESSURE: 83 MMHG | TEMPERATURE: 97 F | RESPIRATION RATE: 18 BRPM | SYSTOLIC BLOOD PRESSURE: 131 MMHG

## 2022-03-21 LAB
APPEARANCE UR: CLEAR — SIGNIFICANT CHANGE UP
BACTERIA # UR AUTO: NEGATIVE — SIGNIFICANT CHANGE UP
BILIRUB UR-MCNC: NEGATIVE — SIGNIFICANT CHANGE UP
COLOR SPEC: COLORLESS — SIGNIFICANT CHANGE UP
DIFF PNL FLD: ABNORMAL
EPI CELLS # UR: 1 /HPF — SIGNIFICANT CHANGE UP (ref 0–5)
GLUCOSE UR QL: NEGATIVE — SIGNIFICANT CHANGE UP
HYALINE CASTS # UR AUTO: 3 /LPF — SIGNIFICANT CHANGE UP (ref 0–7)
KETONES UR-MCNC: NEGATIVE — SIGNIFICANT CHANGE UP
LEUKOCYTE ESTERASE UR-ACNC: ABNORMAL
NITRITE UR-MCNC: NEGATIVE — SIGNIFICANT CHANGE UP
PH UR: 6.5 — SIGNIFICANT CHANGE UP (ref 5–8)
PROT UR-MCNC: ABNORMAL
RBC CASTS # UR COMP ASSIST: 4 /HPF — SIGNIFICANT CHANGE UP (ref 0–4)
SP GR SPEC: 1 — SIGNIFICANT CHANGE UP (ref 1–1.05)
UROBILINOGEN FLD QL: SIGNIFICANT CHANGE UP
WBC UR QL: SIGNIFICANT CHANGE UP /HPF (ref 0–5)

## 2022-03-21 RX ORDER — NITROFURANTOIN MACROCRYSTAL 50 MG
1 CAPSULE ORAL
Qty: 10 | Refills: 0
Start: 2022-03-21 | End: 2022-03-25

## 2022-03-21 RX ORDER — NITROFURANTOIN MACROCRYSTAL 50 MG
100 CAPSULE ORAL ONCE
Refills: 0 | Status: COMPLETED | OUTPATIENT
Start: 2022-03-21 | End: 2022-03-21

## 2022-03-21 RX ADMIN — Medication 100 MILLIGRAM(S): at 00:08

## 2022-03-22 LAB
CULTURE RESULTS: SIGNIFICANT CHANGE UP
SPECIMEN SOURCE: SIGNIFICANT CHANGE UP

## 2022-03-25 ENCOUNTER — APPOINTMENT (OUTPATIENT)
Dept: PEDIATRIC ALLERGY IMMUNOLOGY | Facility: CLINIC | Age: 27
End: 2022-03-25

## 2022-05-18 NOTE — ED PROVIDER NOTE - NSICDXPASTMEDICALHX_GEN_ALL_CORE_FT
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

## 2022-06-26 ENCOUNTER — EMERGENCY (EMERGENCY)
Facility: HOSPITAL | Age: 27
LOS: 1 days | Discharge: HOME | End: 2022-06-26
Attending: EMERGENCY MEDICINE | Admitting: EMERGENCY MEDICINE

## 2022-06-26 VITALS
HEIGHT: 61 IN | OXYGEN SATURATION: 100 % | DIASTOLIC BLOOD PRESSURE: 69 MMHG | RESPIRATION RATE: 12 BRPM | TEMPERATURE: 98 F | SYSTOLIC BLOOD PRESSURE: 123 MMHG | HEART RATE: 84 BPM

## 2022-06-26 VITALS
OXYGEN SATURATION: 100 % | TEMPERATURE: 98 F | DIASTOLIC BLOOD PRESSURE: 70 MMHG | RESPIRATION RATE: 14 BRPM | SYSTOLIC BLOOD PRESSURE: 121 MMHG | HEART RATE: 80 BPM

## 2022-06-26 DIAGNOSIS — Z98.890 OTHER SPECIFIED POSTPROCEDURAL STATES: Chronic | ICD-10-CM

## 2022-06-26 LAB
ALBUMIN SERPL ELPH-MCNC: 4.3 G/DL — SIGNIFICANT CHANGE UP (ref 3.3–5)
ALP SERPL-CCNC: 58 U/L — SIGNIFICANT CHANGE UP (ref 40–120)
ALT FLD-CCNC: 22 U/L — SIGNIFICANT CHANGE UP (ref 4–33)
ANION GAP SERPL CALC-SCNC: 13 MMOL/L — SIGNIFICANT CHANGE UP (ref 7–14)
AST SERPL-CCNC: 20 U/L — SIGNIFICANT CHANGE UP (ref 4–32)
BASOPHILS # BLD AUTO: 0.05 K/UL — SIGNIFICANT CHANGE UP (ref 0–0.2)
BASOPHILS NFR BLD AUTO: 0.5 % — SIGNIFICANT CHANGE UP (ref 0–2)
BILIRUB SERPL-MCNC: 0.3 MG/DL — SIGNIFICANT CHANGE UP (ref 0.2–1.2)
BUN SERPL-MCNC: 8 MG/DL — SIGNIFICANT CHANGE UP (ref 7–23)
CALCIUM SERPL-MCNC: 9.3 MG/DL — SIGNIFICANT CHANGE UP (ref 8.4–10.5)
CHLORIDE SERPL-SCNC: 103 MMOL/L — SIGNIFICANT CHANGE UP (ref 98–107)
CO2 SERPL-SCNC: 24 MMOL/L — SIGNIFICANT CHANGE UP (ref 22–31)
CREAT SERPL-MCNC: 0.59 MG/DL — SIGNIFICANT CHANGE UP (ref 0.5–1.3)
EGFR: 128 ML/MIN/1.73M2 — SIGNIFICANT CHANGE UP
EOSINOPHIL # BLD AUTO: 0.11 K/UL — SIGNIFICANT CHANGE UP (ref 0–0.5)
EOSINOPHIL NFR BLD AUTO: 1.1 % — SIGNIFICANT CHANGE UP (ref 0–6)
GLUCOSE SERPL-MCNC: 69 MG/DL — LOW (ref 70–99)
HCG SERPL-ACNC: <5 MIU/ML — SIGNIFICANT CHANGE UP
HCT VFR BLD CALC: 41.5 % — SIGNIFICANT CHANGE UP (ref 34.5–45)
HGB BLD-MCNC: 13.8 G/DL — SIGNIFICANT CHANGE UP (ref 11.5–15.5)
IANC: 6.23 K/UL — SIGNIFICANT CHANGE UP (ref 1.8–7.4)
IMM GRANULOCYTES NFR BLD AUTO: 0.2 % — SIGNIFICANT CHANGE UP (ref 0–1.5)
LYMPHOCYTES # BLD AUTO: 2.58 K/UL — SIGNIFICANT CHANGE UP (ref 1–3.3)
LYMPHOCYTES # BLD AUTO: 26.1 % — SIGNIFICANT CHANGE UP (ref 13–44)
MCHC RBC-ENTMCNC: 29.6 PG — SIGNIFICANT CHANGE UP (ref 27–34)
MCHC RBC-ENTMCNC: 33.3 GM/DL — SIGNIFICANT CHANGE UP (ref 32–36)
MCV RBC AUTO: 89.1 FL — SIGNIFICANT CHANGE UP (ref 80–100)
MONOCYTES # BLD AUTO: 0.9 K/UL — SIGNIFICANT CHANGE UP (ref 0–0.9)
MONOCYTES NFR BLD AUTO: 9.1 % — SIGNIFICANT CHANGE UP (ref 2–14)
NEUTROPHILS # BLD AUTO: 6.23 K/UL — SIGNIFICANT CHANGE UP (ref 1.8–7.4)
NEUTROPHILS NFR BLD AUTO: 63 % — SIGNIFICANT CHANGE UP (ref 43–77)
NRBC # BLD: 0 /100 WBCS — SIGNIFICANT CHANGE UP
NRBC # FLD: 0 K/UL — SIGNIFICANT CHANGE UP
PLATELET # BLD AUTO: 359 K/UL — SIGNIFICANT CHANGE UP (ref 150–400)
POTASSIUM SERPL-MCNC: 3.9 MMOL/L — SIGNIFICANT CHANGE UP (ref 3.5–5.3)
POTASSIUM SERPL-SCNC: 3.9 MMOL/L — SIGNIFICANT CHANGE UP (ref 3.5–5.3)
PROT SERPL-MCNC: 7.7 G/DL — SIGNIFICANT CHANGE UP (ref 6–8.3)
RBC # BLD: 4.66 M/UL — SIGNIFICANT CHANGE UP (ref 3.8–5.2)
RBC # FLD: 13.1 % — SIGNIFICANT CHANGE UP (ref 10.3–14.5)
SODIUM SERPL-SCNC: 140 MMOL/L — SIGNIFICANT CHANGE UP (ref 135–145)
WBC # BLD: 9.89 K/UL — SIGNIFICANT CHANGE UP (ref 3.8–10.5)
WBC # FLD AUTO: 9.89 K/UL — SIGNIFICANT CHANGE UP (ref 3.8–10.5)

## 2022-06-26 PROCEDURE — 99284 EMERGENCY DEPT VISIT MOD MDM: CPT

## 2022-06-26 RX ORDER — SODIUM CHLORIDE 9 MG/ML
1000 INJECTION INTRAMUSCULAR; INTRAVENOUS; SUBCUTANEOUS ONCE
Refills: 0 | Status: COMPLETED | OUTPATIENT
Start: 2022-06-26 | End: 2022-06-26

## 2022-06-26 RX ORDER — ACETAMINOPHEN 500 MG
650 TABLET ORAL ONCE
Refills: 0 | Status: COMPLETED | OUTPATIENT
Start: 2022-06-26 | End: 2022-06-26

## 2022-06-26 RX ADMIN — Medication 650 MILLIGRAM(S): at 15:07

## 2022-06-26 RX ADMIN — SODIUM CHLORIDE 1000 MILLILITER(S): 9 INJECTION INTRAMUSCULAR; INTRAVENOUS; SUBCUTANEOUS at 13:41

## 2022-06-26 NOTE — ED PROVIDER NOTE - NSFOLLOWUPINSTRUCTIONS_ED_ALL_ED_FT
Rest and stay well hydrated.    Take over the counter acetaminophen (Tylenol) 650-1000 mg every 4-6 hours as needed for pain. Do not take more than 3000 mg in a 24 hour period. Be aware many over the counter and prescription medications also contain acetaminophen (Tylenol).     Follow up with your primary care physician in 1-3 days.    Follow up with your surgeon as scheduled.    Return immediately with any new or worsening symptoms including additional episodes of fainting, chest pain, shortness of breath, numbness, weakness, palpitations, bloody stools, black tarry stools, or any additional concerns.

## 2022-06-26 NOTE — ED PROVIDER NOTE - NS ED ROS FT
Gen: Denies fever.   HEENT: Denies headache. Denies congestion.  CV: Denies chest pain. Denies lightheadedness.  Skin: Denies rash.   Resp: Denies SOB. Denies cough.  GI: Denies abd pain. Denies nausea. Denies vomiting. Denies diarrhea. Denies melena. Denies hematochezia.  Msk: Denies extremity swelling. Denies extremity pain.  : Denies dysuria. Denies hematuria.  Neuro: +LOC. Denies dizziness. Denies new numbness/tingling. Denies new focal weakness.  Psych: Denies SI

## 2022-06-26 NOTE — ED ADULT NURSE NOTE - OBJECTIVE STATEMENT
pt received room 10, alert & awake, A&OX4. Pt complaint of witnessed syncopal episode last night @ bar. Pt states felt lightheaded, nauseous, & had passed out after smoking hookah hitting her head.. Pt denies PMH. Pt PSH of fat transfer on buttocks. Pt on antibiotics for localized skin infection from complication of the procedure. Buttocks skin is darkened locally outerbuttocks. Lungs clear on auscultation. pt denies sob, c/p, fever, chills. Callbell @ bedside.

## 2022-06-26 NOTE — ED PROVIDER NOTE - CLINICAL SUMMARY MEDICAL DECISION MAKING FREE TEXT BOX
Likely vasovagal syncope, consider dehydration, orthostatic hypotension. Consider electrolyte abnormality, anemia. Less likely arrhythmia. Mild HA. Over 12 hours since head injury, no outward signs of head trauma,  and neuro intact. PERC negative. No signs of skin infection - likely chronic changes 2/2 to prior surgery. Labs, beta hcg, ecg, hydration, will reassess.

## 2022-06-26 NOTE — ED PROVIDER NOTE - PHYSICAL EXAMINATION
Gen: A&Ox4   HEENT: Atraumatic. Mucous membranes moist, no scleral icterus.  CV: RRR. No significant lower extremity edema.   Resp: Respirations unlabored. CTAB, no rales, no wheezes.  GI: Abdomen non tender to palpation, soft and non-distended.   Skin/MSK: Area of indurated skin over right buttock w/o ttp or overlying erythema. No fluctuance. Same over midline L spine.   Neuro: Following commands. EOMI. Pupils ERRL. Neuro: CN2-12 grossly intact. Motor strength +5/5 throughout upper and lower extremities. Sensation intact throughout upper and lower extremities. Finger to nose smooth and coordinated. No pronator drift. Gait stable and coordinated.  Psych: Appropriate mood, cooperative Gen: A&Ox4   HEENT: Atraumatic. Mucous membranes moist, no scleral icterus.  CV: RRR. No significant lower extremity edema.   Resp: Respirations unlabored. CTAB, no rales, no wheezes.  GI: Abdomen non tender to palpation, soft and non-distended.   Skin/MSK: Focal circular Areas of induration over right buttock w/o ttp or overlying erythema. No fluctuance. Same over midline L spinal region.   Neuro: Following commands. EOMI. Pupils ERRL. Neuro: CN2-12 grossly intact. Motor strength +5/5 throughout upper and lower extremities. Sensation intact throughout upper and lower extremities. Finger to nose smooth and coordinated. No pronator drift. Gait stable and coordinated.  Psych: Appropriate mood, cooperative

## 2022-06-26 NOTE — ED PROVIDER NOTE - ATTENDING CONTRIBUTION TO CARE
GEN - NAD; well appearing; A+O x3   HEAD - NC/AT   EYES- PERRL, EOMI  ENT: Airway patent, mmm, Oral cavity and pharynx normal. No inflammation, swelling, exudate, or lesions.  NECK: Neck supple, non-tender without lymphadenopathy, no masses.  PULMONARY - CTA b/l, symmetric breath sounds.   CARDIAC -s1s2, RRR, no M,G,R  ABDOMEN - +BS, ND, NT, soft, no guarding, no rebound, no masses   BACK - no CVA tenderness, Normal  spine   EXTREMITIES - FROM, symmetric pulses, capillary refill < 2 seconds, no edema   SKIN - no rash, several circular firm regions to gluteus muscles and left lumbar paraspinal regions, nontender, no overlying erythema/warmth, no fluctuance, no crepitus  NEUROLOGIC - ao3, cn2-12 intact, 5/5 strength in all extremities, sensation grossly intact, f-n nl, gait steady  PSYCH -nl mood/affect, nl insight.  a.p-26f presents to the Ed for episode of syncope. patient states she was out yesterday-took a hit of hooka-shortly after became nauseated, felt lightheaded, went to the bathroom and passed out-fell down and hit her head, witnessed by friend, no shaking, no incontinence, ambulatory afterward. No ha, vision changes, unilateral weakness, numbness, imbalance, speech difficulty, confusion, cp, sob, cough, fevers, dysuria, edema. States her head feels light and mild nausea but does not feel like she is going to pass out again. Also notes had bbl 1/22, initially c/b cellulitis s/p tx and now retreated last week by surgeon. Has firm regions to her back and buttock since surgery which since being on abx have not changed or worsened. No fevers. On exam appears nontoxic, ao3, nl neuro exam, unlabored breathing, clear lungs, abd soft/nt, no edema-notable firm circular regions to buttock/back-nontender, no erythema/fluctuance. Plan for labs/ekg/hydration/tele monitoring-eval for diff including anemia, elec disturbance, organ dysfunction, screen for arrythmia-suspect likely vasovagal. Shared decision making discussion had with patient regarding ct brain-at this time opts not to do ct brain given neuro intact.

## 2022-06-26 NOTE — ED PROVIDER NOTE - PATIENT PORTAL LINK FT
You can access the FollowMyHealth Patient Portal offered by [facility] by registering at the following website: [email or web address]. By joining [employer]’s FollowMyHealth portal, you will also be able to view your health information using other applications (apps) compatible with our system.

## 2022-06-26 NOTE — ED PROVIDER NOTE - PROGRESS NOTE DETAILS
Giuliana-PGY3 (MILLY): pt seen and re-evaluated at bedside.  Pt states her symptoms have improved.  Pt comfortable in NAD. Discussed lab results with pt. No events on tele monitoring. Will DC with PMD follow up and return precautions. Pt understood and agreeable with plan.

## 2022-06-26 NOTE — ED PROVIDER NOTE - OBJECTIVE STATEMENT
27 yo F PMHx of BBL in Jan 2022, post-op course complicated by cellulitis, now presenting to ED for c/o hardening of the skin over her right buttock x 1 month. Pt concerned she may have an infection at the site. Also reports hard bumps over midline L spine just beneath her skin. Denies fevers or significant pain. Pt started on Keflex by her surgeon ~1 week ago w/o improvement. Pt also reports LOC yesterday evening while out at a bar. States she took a hit of Hooka, then became nauseated, and then lost consciousness in the bathroom. Did not vomit. States LOC was brief and witnessed by her friend. Hit right side of her head. No seizure like activity, incontinence, or tongue biting. Has had mild lightheadedness since then, no focal weakness or vision changes. No neck pain. Does not think hooka was contaminated w/ other drugs. Not on oral contraception. Denies leg swelling. No personal or family hx of arrhythmia. Denies CP or SOB. LMP June 14th. 27 yo F PMHx of BBL in Jan 2022, post-op course complicated by cellulitis, reports LOC yesterday evening while out at a bar. States she took a hit of Hooka, then became nauseated, and then lost consciousness in the bathroom. Did not vomit. States LOC was brief and witnessed by her friend. Hit right side of her head. No seizure like activity, incontinence, or tongue biting. Has had mild lightheadedness since then, no focal weakness or vision changes. No neck pain. Does not think hooka was contaminated w/ other drugs. Not on oral contraception. Denies leg swelling. No personal or family hx of arrhythmia. Denies CP or SOB. LMP June 14th.  now presenting to ED for c/o hardening of the skin over her right buttock x 1 month. Pt concerned she may have an infection at the site. Also reports hard bumps over midline L spine just beneath her skin. Denies fevers or significant pain. Pt started on Keflex by her surgeon ~1 week ago w/o any worsening or change.

## 2022-06-26 NOTE — ED ADULT TRIAGE NOTE - CHIEF COMPLAINT QUOTE
pt ambulatory to walk in triage states she was out at 11pm drinking alcohol and taking huka when she passed out and hit her head. denies bleeding, pt states she does not have pain, just feels lightheaded. pt taking antibiotics for cellulitis on left hip.

## 2023-01-12 NOTE — PATIENT PROFILE ADULT - SURGICAL SITE DRAIN
January 12, 2023     Patient: Fidel Alvarez  YOB: 1964  Date of Visit: 1/12/2023      To Whom it May Concern:    Marylen Furlong is under my professional care  Jayna Escobar was seen in my office on 1/12/2023  Please excuse from work 1/12/23 and 1/13/23  If you have any questions or concerns, please don't hesitate to call           Sincerely,          ASA Grijalva        CC: No Recipients
no

## 2023-04-20 ENCOUNTER — EMERGENCY (EMERGENCY)
Facility: HOSPITAL | Age: 28
LOS: 1 days | Discharge: HOME | End: 2023-04-20
Admitting: EMERGENCY MEDICINE
Payer: MEDICAID

## 2023-04-20 VITALS
HEART RATE: 85 BPM | SYSTOLIC BLOOD PRESSURE: 116 MMHG | TEMPERATURE: 99 F | DIASTOLIC BLOOD PRESSURE: 85 MMHG | RESPIRATION RATE: 16 BRPM | OXYGEN SATURATION: 100 %

## 2023-04-20 DIAGNOSIS — Z98.890 OTHER SPECIFIED POSTPROCEDURAL STATES: Chronic | ICD-10-CM

## 2023-04-20 PROCEDURE — 99284 EMERGENCY DEPT VISIT MOD MDM: CPT

## 2023-04-20 NOTE — ED PROVIDER NOTE - PATIENT PORTAL LINK FT
You can access the FollowMyHealth Patient Portal offered by [facility] by registering at the following website: [email or web address]. By joining "[employer]’s FollowMyHealth portal, you will also be able to view your health information using other applications (apps) compatible with our system.

## 2023-04-20 NOTE — ED PROVIDER NOTE - CLINICAL SUMMARY MEDICAL DECISION MAKING FREE TEXT BOX
26 yo F s/p liposuction with BBL procedure with Dr. Wadsworth in early 2022 with complication of post operative pain and cellulitis requiring multiple hospital visits. She presents today with concern of a bump she feels above umbilicus that occasionally causes discomfort when wearing tight pants or pressing on the area. She is in no pain today. She states she has had routine follow up with Dr. Wadsworth and saw him most recently last month where she mentioned the bump and he was not concerned about further complications.     PE - no appreciable bulge felt above umbilicus at rest, with cough or with crunch. Abdomen is soft and nontender. No skin changes in area.     Plan - With operative procedure over one year ago, discussed the clinical presentation leaves low suspicion of further post operative complications including infection. Discussed the nerves in the area can take time to regenerate and her occasional discomfort when wearing tight pants may irritate the nerves. Recommended to continue to follow up with surgeon, Dr. Wadsworth for further care and concerns.

## 2023-04-20 NOTE — ED PROVIDER NOTE - OBJECTIVE STATEMENT
26 yo F s/p liposuction with BBL procedure with Dr. Wadsworth in early 2022 with complication of post operative pain and cellulitis requiring multiple hospital visits, She presents today with concern of a bump she feels above umbilicus that occasionally causes discomfort when wearing tight pants or pressing on the area. She is in no pain today. She states she has had routine follow up with Dr. Wadsworth and saw him most recently last month where she mentioned the bump and he was not concerned about further complications. She was prompted to come to ED for further evaluation and reassurance she did not have further complications.   Denies fever, chills, night sweats, fatigue, weight change, headache, dizziness, abdominal pain, nausea, vomiting, diarrhea, constipation.

## 2023-04-20 NOTE — ED PROVIDER NOTE - GASTROINTESTINAL, MLM
Abdomen soft, non-tender, no guarding. No appreciable bump or mass palpated above umbilicus. No appreciable bulge seen with cough or crunch motion.

## 2024-08-02 NOTE — ED ADULT NURSE NOTE - NSIMPLEMENTINTERV_GEN_ALL_ED
-Transthoracic echocardiogram showing left-ventricular systolic function lower limits of normal estimated LVEF 50% with regional wall motion abnormalities in the septum and basal inferior wall  -Patient will benefit from outpatient testing and will follow-up with her primary cardiologist Dr. Peña for additional evaluation.  -Continue medical therapy and uptitrate as patient tolerates  -Patient counseled on dietary lifestyle modifications including sodium and fluid restricted diet along with continued torsemide 10 mg daily with 10 mEq potassium daily.   Implemented All Universal Safety Interventions:  [place] to call system. Call bell, personal items and telephone within reach. Instruct patient to call for assistance. Room bathroom lighting operational. Non-slip footwear when patient is off stretcher. Physically safe environment: no spills, clutter or unnecessary equipment. Stretcher in lowest position, wheels locked, appropriate side rails in place.

## 2024-11-19 NOTE — ED ADULT TRIAGE NOTE - MODE OF ARRIVAL
Called Mr. Haley for chronic care [facility] outreach. Unable to reach. Vm box full. Chart reviewed.   
Walk in